# Patient Record
Sex: MALE | Race: WHITE | NOT HISPANIC OR LATINO | URBAN - METROPOLITAN AREA
[De-identification: names, ages, dates, MRNs, and addresses within clinical notes are randomized per-mention and may not be internally consistent; named-entity substitution may affect disease eponyms.]

---

## 2020-02-17 ENCOUNTER — OFFICE VISIT - HEALTHEAST (OUTPATIENT)
Dept: FAMILY MEDICINE | Facility: CLINIC | Age: 25
End: 2020-02-17

## 2020-02-17 DIAGNOSIS — Z76.89 ENCOUNTER TO ESTABLISH CARE: ICD-10-CM

## 2020-02-17 DIAGNOSIS — F41.1 GAD (GENERALIZED ANXIETY DISORDER): ICD-10-CM

## 2020-02-17 DIAGNOSIS — F33.1 MODERATE EPISODE OF RECURRENT MAJOR DEPRESSIVE DISORDER (H): ICD-10-CM

## 2020-02-17 DIAGNOSIS — J45.20 MILD INTERMITTENT ASTHMA WITHOUT COMPLICATION: ICD-10-CM

## 2020-02-17 DIAGNOSIS — E66.813 CLASS 3 SEVERE OBESITY DUE TO EXCESS CALORIES WITHOUT SERIOUS COMORBIDITY WITH BODY MASS INDEX (BMI) OF 40.0 TO 44.9 IN ADULT (H): ICD-10-CM

## 2020-02-17 DIAGNOSIS — E66.01 CLASS 3 SEVERE OBESITY DUE TO EXCESS CALORIES WITHOUT SERIOUS COMORBIDITY WITH BODY MASS INDEX (BMI) OF 40.0 TO 44.9 IN ADULT (H): ICD-10-CM

## 2020-02-17 LAB
ANION GAP SERPL CALCULATED.3IONS-SCNC: 10 MMOL/L (ref 5–18)
BUN SERPL-MCNC: 13 MG/DL (ref 8–22)
CALCIUM SERPL-MCNC: 10.4 MG/DL (ref 8.5–10.5)
CHLORIDE BLD-SCNC: 105 MMOL/L (ref 98–107)
CHOLEST SERPL-MCNC: 144 MG/DL
CO2 SERPL-SCNC: 25 MMOL/L (ref 22–31)
CREAT SERPL-MCNC: 1.22 MG/DL (ref 0.7–1.3)
FASTING STATUS PATIENT QL REPORTED: YES
GFR SERPL CREATININE-BSD FRML MDRD: >60 ML/MIN/1.73M2
GLUCOSE BLD-MCNC: 98 MG/DL (ref 70–125)
HDLC SERPL-MCNC: 39 MG/DL
LDLC SERPL CALC-MCNC: 77 MG/DL
POTASSIUM BLD-SCNC: 4.7 MMOL/L (ref 3.5–5)
SODIUM SERPL-SCNC: 140 MMOL/L (ref 136–145)
TRIGL SERPL-MCNC: 142 MG/DL
TSH SERPL DL<=0.005 MIU/L-ACNC: 0.87 UIU/ML (ref 0.3–5)

## 2020-02-17 ASSESSMENT — ANXIETY QUESTIONNAIRES
GAD7 TOTAL SCORE: 12
6. BECOMING EASILY ANNOYED OR IRRITABLE: MORE THAN HALF THE DAYS
4. TROUBLE RELAXING: MORE THAN HALF THE DAYS
7. FEELING AFRAID AS IF SOMETHING AWFUL MIGHT HAPPEN: SEVERAL DAYS
3. WORRYING TOO MUCH ABOUT DIFFERENT THINGS: MORE THAN HALF THE DAYS
5. BEING SO RESTLESS THAT IT IS HARD TO SIT STILL: SEVERAL DAYS
1. FEELING NERVOUS, ANXIOUS, OR ON EDGE: MORE THAN HALF THE DAYS
IF YOU CHECKED OFF ANY PROBLEMS ON THIS QUESTIONNAIRE, HOW DIFFICULT HAVE THESE PROBLEMS MADE IT FOR YOU TO DO YOUR WORK, TAKE CARE OF THINGS AT HOME, OR GET ALONG WITH OTHER PEOPLE: SOMEWHAT DIFFICULT
2. NOT BEING ABLE TO STOP OR CONTROL WORRYING: MORE THAN HALF THE DAYS

## 2020-02-17 ASSESSMENT — PATIENT HEALTH QUESTIONNAIRE - PHQ9: SUM OF ALL RESPONSES TO PHQ QUESTIONS 1-9: 3

## 2020-02-17 ASSESSMENT — MIFFLIN-ST. JEOR: SCORE: 2730.04

## 2020-02-19 ENCOUNTER — AMBULATORY - HEALTHEAST (OUTPATIENT)
Dept: BEHAVIORAL HEALTH | Facility: CLINIC | Age: 25
End: 2020-02-19

## 2020-03-17 ENCOUNTER — COMMUNICATION - HEALTHEAST (OUTPATIENT)
Dept: FAMILY MEDICINE | Facility: CLINIC | Age: 25
End: 2020-03-17

## 2020-03-18 ENCOUNTER — OFFICE VISIT - HEALTHEAST (OUTPATIENT)
Dept: FAMILY MEDICINE | Facility: CLINIC | Age: 25
End: 2020-03-18

## 2020-03-18 DIAGNOSIS — J45.20 MILD INTERMITTENT ASTHMA WITHOUT COMPLICATION: ICD-10-CM

## 2020-03-18 DIAGNOSIS — F41.1 GAD (GENERALIZED ANXIETY DISORDER): ICD-10-CM

## 2020-03-18 DIAGNOSIS — F33.1 MODERATE EPISODE OF RECURRENT MAJOR DEPRESSIVE DISORDER (H): ICD-10-CM

## 2020-03-18 ASSESSMENT — ANXIETY QUESTIONNAIRES
5. BEING SO RESTLESS THAT IT IS HARD TO SIT STILL: SEVERAL DAYS
IF YOU CHECKED OFF ANY PROBLEMS ON THIS QUESTIONNAIRE, HOW DIFFICULT HAVE THESE PROBLEMS MADE IT FOR YOU TO DO YOUR WORK, TAKE CARE OF THINGS AT HOME, OR GET ALONG WITH OTHER PEOPLE: SOMEWHAT DIFFICULT
2. NOT BEING ABLE TO STOP OR CONTROL WORRYING: MORE THAN HALF THE DAYS
7. FEELING AFRAID AS IF SOMETHING AWFUL MIGHT HAPPEN: SEVERAL DAYS
1. FEELING NERVOUS, ANXIOUS, OR ON EDGE: MORE THAN HALF THE DAYS
GAD7 TOTAL SCORE: 11
6. BECOMING EASILY ANNOYED OR IRRITABLE: SEVERAL DAYS
3. WORRYING TOO MUCH ABOUT DIFFERENT THINGS: NEARLY EVERY DAY
4. TROUBLE RELAXING: SEVERAL DAYS

## 2020-03-18 ASSESSMENT — PATIENT HEALTH QUESTIONNAIRE - PHQ9: SUM OF ALL RESPONSES TO PHQ QUESTIONS 1-9: 4

## 2020-05-11 ENCOUNTER — COMMUNICATION - HEALTHEAST (OUTPATIENT)
Dept: FAMILY MEDICINE | Facility: CLINIC | Age: 25
End: 2020-05-11

## 2020-05-11 DIAGNOSIS — F33.1 MODERATE EPISODE OF RECURRENT MAJOR DEPRESSIVE DISORDER (H): ICD-10-CM

## 2020-05-11 DIAGNOSIS — F41.1 GAD (GENERALIZED ANXIETY DISORDER): ICD-10-CM

## 2020-05-27 ENCOUNTER — COMMUNICATION - HEALTHEAST (OUTPATIENT)
Dept: FAMILY MEDICINE | Facility: CLINIC | Age: 25
End: 2020-05-27

## 2020-05-27 DIAGNOSIS — F41.1 GAD (GENERALIZED ANXIETY DISORDER): ICD-10-CM

## 2020-05-27 DIAGNOSIS — F33.1 MODERATE EPISODE OF RECURRENT MAJOR DEPRESSIVE DISORDER (H): ICD-10-CM

## 2020-06-18 ENCOUNTER — OFFICE VISIT - HEALTHEAST (OUTPATIENT)
Dept: FAMILY MEDICINE | Facility: CLINIC | Age: 25
End: 2020-06-18

## 2020-06-18 DIAGNOSIS — F33.1 MAJOR DEPRESSIVE DISORDER, RECURRENT EPISODE, MODERATE (H): ICD-10-CM

## 2020-06-18 ASSESSMENT — ANXIETY QUESTIONNAIRES
1. FEELING NERVOUS, ANXIOUS, OR ON EDGE: NEARLY EVERY DAY
5. BEING SO RESTLESS THAT IT IS HARD TO SIT STILL: NEARLY EVERY DAY
3. WORRYING TOO MUCH ABOUT DIFFERENT THINGS: NEARLY EVERY DAY
6. BECOMING EASILY ANNOYED OR IRRITABLE: NEARLY EVERY DAY
4. TROUBLE RELAXING: NEARLY EVERY DAY
GAD7 TOTAL SCORE: 21
7. FEELING AFRAID AS IF SOMETHING AWFUL MIGHT HAPPEN: NEARLY EVERY DAY
2. NOT BEING ABLE TO STOP OR CONTROL WORRYING: NEARLY EVERY DAY

## 2020-07-14 ENCOUNTER — OFFICE VISIT - HEALTHEAST (OUTPATIENT)
Dept: FAMILY MEDICINE | Facility: CLINIC | Age: 25
End: 2020-07-14

## 2020-07-14 DIAGNOSIS — F41.1 GAD (GENERALIZED ANXIETY DISORDER): ICD-10-CM

## 2020-07-14 DIAGNOSIS — F33.1 MODERATE EPISODE OF RECURRENT MAJOR DEPRESSIVE DISORDER (H): ICD-10-CM

## 2020-08-03 ENCOUNTER — OFFICE VISIT - HEALTHEAST (OUTPATIENT)
Dept: FAMILY MEDICINE | Facility: CLINIC | Age: 25
End: 2020-08-03

## 2020-08-03 DIAGNOSIS — F33.1 MODERATE EPISODE OF RECURRENT MAJOR DEPRESSIVE DISORDER (H): ICD-10-CM

## 2020-08-03 DIAGNOSIS — F41.0 PANIC ATTACK: ICD-10-CM

## 2020-08-05 ENCOUNTER — COMMUNICATION - HEALTHEAST (OUTPATIENT)
Dept: FAMILY MEDICINE | Facility: CLINIC | Age: 25
End: 2020-08-05

## 2020-08-05 DIAGNOSIS — F33.1 MODERATE EPISODE OF RECURRENT MAJOR DEPRESSIVE DISORDER (H): ICD-10-CM

## 2020-08-05 DIAGNOSIS — F41.1 GAD (GENERALIZED ANXIETY DISORDER): ICD-10-CM

## 2020-08-19 ENCOUNTER — OFFICE VISIT - HEALTHEAST (OUTPATIENT)
Dept: FAMILY MEDICINE | Facility: CLINIC | Age: 25
End: 2020-08-19

## 2020-08-19 DIAGNOSIS — F41.1 GAD (GENERALIZED ANXIETY DISORDER): ICD-10-CM

## 2020-08-19 DIAGNOSIS — J45.20 MILD INTERMITTENT ASTHMA WITHOUT COMPLICATION: ICD-10-CM

## 2020-08-19 DIAGNOSIS — F33.1 MODERATE EPISODE OF RECURRENT MAJOR DEPRESSIVE DISORDER (H): ICD-10-CM

## 2020-08-19 RX ORDER — ALBUTEROL SULFATE 90 UG/1
2 AEROSOL, METERED RESPIRATORY (INHALATION) EVERY 4 HOURS PRN
Qty: 1 EACH | Refills: 3 | Status: SHIPPED | OUTPATIENT
Start: 2020-08-19

## 2020-08-19 RX ORDER — HYDROXYZINE PAMOATE 25 MG/1
25-50 CAPSULE ORAL 2 TIMES DAILY PRN
Qty: 60 CAPSULE | Refills: 0 | Status: SHIPPED | OUTPATIENT
Start: 2020-08-19

## 2020-08-19 ASSESSMENT — ANXIETY QUESTIONNAIRES
7. FEELING AFRAID AS IF SOMETHING AWFUL MIGHT HAPPEN: SEVERAL DAYS
2. NOT BEING ABLE TO STOP OR CONTROL WORRYING: SEVERAL DAYS
3. WORRYING TOO MUCH ABOUT DIFFERENT THINGS: MORE THAN HALF THE DAYS
IF YOU CHECKED OFF ANY PROBLEMS ON THIS QUESTIONNAIRE, HOW DIFFICULT HAVE THESE PROBLEMS MADE IT FOR YOU TO DO YOUR WORK, TAKE CARE OF THINGS AT HOME, OR GET ALONG WITH OTHER PEOPLE: SOMEWHAT DIFFICULT
GAD7 TOTAL SCORE: 9
1. FEELING NERVOUS, ANXIOUS, OR ON EDGE: MORE THAN HALF THE DAYS
6. BECOMING EASILY ANNOYED OR IRRITABLE: SEVERAL DAYS
5. BEING SO RESTLESS THAT IT IS HARD TO SIT STILL: SEVERAL DAYS
4. TROUBLE RELAXING: SEVERAL DAYS

## 2020-08-19 ASSESSMENT — PATIENT HEALTH QUESTIONNAIRE - PHQ9: SUM OF ALL RESPONSES TO PHQ QUESTIONS 1-9: 6

## 2020-09-03 ENCOUNTER — COMMUNICATION - HEALTHEAST (OUTPATIENT)
Dept: FAMILY MEDICINE | Facility: CLINIC | Age: 25
End: 2020-09-03

## 2020-09-03 DIAGNOSIS — F41.1 GAD (GENERALIZED ANXIETY DISORDER): ICD-10-CM

## 2020-09-03 DIAGNOSIS — F33.1 MODERATE EPISODE OF RECURRENT MAJOR DEPRESSIVE DISORDER (H): ICD-10-CM

## 2020-09-06 RX ORDER — BUPROPION HYDROCHLORIDE 300 MG/1
300 TABLET ORAL EVERY MORNING
Qty: 90 TABLET | Refills: 3 | Status: SHIPPED | OUTPATIENT
Start: 2020-09-06 | End: 2021-08-18

## 2020-09-24 ENCOUNTER — RECORDS - HEALTHEAST (OUTPATIENT)
Dept: ADMINISTRATIVE | Facility: OTHER | Age: 25
End: 2020-09-24

## 2020-10-22 ENCOUNTER — OFFICE VISIT - HEALTHEAST (OUTPATIENT)
Dept: BEHAVIORAL HEALTH | Facility: CLINIC | Age: 25
End: 2020-10-22

## 2020-10-22 DIAGNOSIS — F41.9 ANXIETY DISORDER, UNSPECIFIED TYPE: ICD-10-CM

## 2020-10-22 DIAGNOSIS — F32.9 MAJOR DEPRESSIVE DISORDER, SINGLE EPISODE, UNSPECIFIED: ICD-10-CM

## 2020-10-24 ENCOUNTER — COMMUNICATION - HEALTHEAST (OUTPATIENT)
Dept: BEHAVIORAL HEALTH | Facility: CLINIC | Age: 25
End: 2020-10-24

## 2020-10-27 ASSESSMENT — ANXIETY QUESTIONNAIRES
6. BECOMING EASILY ANNOYED OR IRRITABLE: SEVERAL DAYS
5. BEING SO RESTLESS THAT IT IS HARD TO SIT STILL: SEVERAL DAYS
2. NOT BEING ABLE TO STOP OR CONTROL WORRYING: MORE THAN HALF THE DAYS
4. TROUBLE RELAXING: SEVERAL DAYS
3. WORRYING TOO MUCH ABOUT DIFFERENT THINGS: MORE THAN HALF THE DAYS
1. FEELING NERVOUS, ANXIOUS, OR ON EDGE: MORE THAN HALF THE DAYS
7. FEELING AFRAID AS IF SOMETHING AWFUL MIGHT HAPPEN: SEVERAL DAYS
GAD7 TOTAL SCORE: 10

## 2020-10-27 ASSESSMENT — PATIENT HEALTH QUESTIONNAIRE - PHQ9: SUM OF ALL RESPONSES TO PHQ QUESTIONS 1-9: 9

## 2020-11-12 ENCOUNTER — OFFICE VISIT - HEALTHEAST (OUTPATIENT)
Dept: BEHAVIORAL HEALTH | Facility: CLINIC | Age: 25
End: 2020-11-12

## 2020-11-12 DIAGNOSIS — F32.9 MAJOR DEPRESSIVE DISORDER, SINGLE EPISODE, UNSPECIFIED: ICD-10-CM

## 2020-11-12 DIAGNOSIS — F41.1 GAD (GENERALIZED ANXIETY DISORDER): ICD-10-CM

## 2020-11-17 ENCOUNTER — OFFICE VISIT - HEALTHEAST (OUTPATIENT)
Dept: BEHAVIORAL HEALTH | Facility: CLINIC | Age: 25
End: 2020-11-17

## 2020-11-17 DIAGNOSIS — F32.9 CURRENT EPISODE OF MAJOR DEPRESSIVE DISORDER WITHOUT PRIOR EPISODE, UNSPECIFIED DEPRESSION EPISODE SEVERITY: ICD-10-CM

## 2020-11-17 DIAGNOSIS — F41.1 GAD (GENERALIZED ANXIETY DISORDER): ICD-10-CM

## 2020-11-30 ENCOUNTER — OFFICE VISIT - HEALTHEAST (OUTPATIENT)
Dept: BEHAVIORAL HEALTH | Facility: CLINIC | Age: 25
End: 2020-11-30

## 2020-11-30 DIAGNOSIS — F32.9 CURRENT EPISODE OF MAJOR DEPRESSIVE DISORDER WITHOUT PRIOR EPISODE, UNSPECIFIED DEPRESSION EPISODE SEVERITY: ICD-10-CM

## 2020-11-30 DIAGNOSIS — F41.1 GAD (GENERALIZED ANXIETY DISORDER): ICD-10-CM

## 2020-12-14 ENCOUNTER — OFFICE VISIT - HEALTHEAST (OUTPATIENT)
Dept: BEHAVIORAL HEALTH | Facility: CLINIC | Age: 25
End: 2020-12-14

## 2020-12-14 DIAGNOSIS — F41.1 GAD (GENERALIZED ANXIETY DISORDER): ICD-10-CM

## 2020-12-14 DIAGNOSIS — F32.9 CURRENT EPISODE OF MAJOR DEPRESSIVE DISORDER WITHOUT PRIOR EPISODE, UNSPECIFIED DEPRESSION EPISODE SEVERITY: ICD-10-CM

## 2021-01-04 ENCOUNTER — AMBULATORY - HEALTHEAST (OUTPATIENT)
Dept: BEHAVIORAL HEALTH | Facility: CLINIC | Age: 26
End: 2021-01-04

## 2021-01-04 ENCOUNTER — OFFICE VISIT - HEALTHEAST (OUTPATIENT)
Dept: BEHAVIORAL HEALTH | Facility: CLINIC | Age: 26
End: 2021-01-04

## 2021-01-04 DIAGNOSIS — F32.9 CURRENT EPISODE OF MAJOR DEPRESSIVE DISORDER WITHOUT PRIOR EPISODE, UNSPECIFIED DEPRESSION EPISODE SEVERITY: ICD-10-CM

## 2021-01-04 DIAGNOSIS — F41.1 GAD (GENERALIZED ANXIETY DISORDER): ICD-10-CM

## 2021-02-10 ENCOUNTER — OFFICE VISIT - HEALTHEAST (OUTPATIENT)
Dept: FAMILY MEDICINE | Facility: CLINIC | Age: 26
End: 2021-02-10

## 2021-02-10 DIAGNOSIS — F41.9 ANXIETY: ICD-10-CM

## 2021-02-16 ENCOUNTER — OFFICE VISIT - HEALTHEAST (OUTPATIENT)
Dept: FAMILY MEDICINE | Facility: CLINIC | Age: 26
End: 2021-02-16

## 2021-02-16 ENCOUNTER — COMMUNICATION - HEALTHEAST (OUTPATIENT)
Dept: FAMILY MEDICINE | Facility: CLINIC | Age: 26
End: 2021-02-16

## 2021-02-16 DIAGNOSIS — R41.3 MEMORY DEFICIT: ICD-10-CM

## 2021-02-16 DIAGNOSIS — F41.0 PANIC ATTACK: ICD-10-CM

## 2021-02-16 DIAGNOSIS — F33.1 MODERATE EPISODE OF RECURRENT MAJOR DEPRESSIVE DISORDER (H): ICD-10-CM

## 2021-02-16 RX ORDER — LORAZEPAM 0.5 MG/1
0.5 TABLET ORAL EVERY 8 HOURS PRN
Qty: 10 TABLET | Refills: 0 | Status: SHIPPED | OUTPATIENT
Start: 2021-02-16

## 2021-02-21 ASSESSMENT — ANXIETY QUESTIONNAIRES
GAD7 TOTAL SCORE: 9
3. WORRYING TOO MUCH ABOUT DIFFERENT THINGS: SEVERAL DAYS
7. FEELING AFRAID AS IF SOMETHING AWFUL MIGHT HAPPEN: NOT AT ALL
6. BECOMING EASILY ANNOYED OR IRRITABLE: MORE THAN HALF THE DAYS
2. NOT BEING ABLE TO STOP OR CONTROL WORRYING: SEVERAL DAYS
7. FEELING AFRAID AS IF SOMETHING AWFUL MIGHT HAPPEN: NOT AT ALL
5. BEING SO RESTLESS THAT IT IS HARD TO SIT STILL: MORE THAN HALF THE DAYS
4. TROUBLE RELAXING: MORE THAN HALF THE DAYS
1. FEELING NERVOUS, ANXIOUS, OR ON EDGE: SEVERAL DAYS

## 2021-02-21 ASSESSMENT — PATIENT HEALTH QUESTIONNAIRE - PHQ9
10. IF YOU CHECKED OFF ANY PROBLEMS, HOW DIFFICULT HAVE THESE PROBLEMS MADE IT FOR YOU TO DO YOUR WORK, TAKE CARE OF THINGS AT HOME, OR GET ALONG WITH OTHER PEOPLE: VERY DIFFICULT
SUM OF ALL RESPONSES TO PHQ QUESTIONS 1-9: 11
SUM OF ALL RESPONSES TO PHQ QUESTIONS 1-9: 11

## 2021-02-22 ENCOUNTER — VIRTUAL VISIT (OUTPATIENT)
Dept: PSYCHOLOGY | Facility: CLINIC | Age: 26
End: 2021-02-22
Payer: COMMERCIAL

## 2021-02-22 DIAGNOSIS — F41.1 GENERALIZED ANXIETY DISORDER: ICD-10-CM

## 2021-02-22 DIAGNOSIS — F33.0 MAJOR DEPRESSIVE DISORDER, RECURRENT EPISODE, MILD WITH ANXIOUS DISTRESS (H): Primary | ICD-10-CM

## 2021-02-22 PROCEDURE — 90791 PSYCH DIAGNOSTIC EVALUATION: CPT | Mod: 95 | Performed by: PSYCHOLOGIST

## 2021-02-22 ASSESSMENT — COLUMBIA-SUICIDE SEVERITY RATING SCALE - C-SSRS
1. IN THE PAST MONTH, HAVE YOU WISHED YOU WERE DEAD OR WISHED YOU COULD GO TO SLEEP AND NOT WAKE UP?: NO
TOTAL  NUMBER OF ABORTED OR SELF INTERRUPTED ATTEMPTS PAST LIFETIME: NO

## 2021-02-22 ASSESSMENT — ANXIETY QUESTIONNAIRES: GAD7 TOTAL SCORE: 9

## 2021-02-22 ASSESSMENT — PATIENT HEALTH QUESTIONNAIRE - PHQ9: SUM OF ALL RESPONSES TO PHQ QUESTIONS 1-9: 11

## 2021-02-22 NOTE — PROGRESS NOTES
"[facility]   Provider Name:  Swapna Greta     Credentials:  Charles PUENTES    PATIENT'S NAME: Jose D Dickens III  PREFERRED NAME: Gene  PRONOUNS:      He/Him/His  MRN: [id number]  : 1995  ADDRESS: [address] [place] [id number]   ACCT. NUMBER:  [id number]  DATE OF SERVICE: 21  START TIME: 7:00AM  END TIME: 7:55AM  PREFERRED PHONE: [phone]   May we leave a program related message: Yes  SERVICE MODALITY:  Video Visit:      Provider verified identity through the following two step process.  Patient provided:  Patient     Telemedicine Visit: The patient's condition can be safely assessed and treated via synchronous audio and visual telemedicine encounter.      Reason for Telemedicine Visit: Services only offered telehealth    Originating Site (Patient Location): Patient's home    Distant Site (Provider Location): Provider Remote Setting    Consent:  The patient/guardian has verbally consented to: the potential risks and benefits of telemedicine (video visit) versus in person care; bill my insurance or make self-payment for services provided; and responsibility for payment of non-covered services.     Patient would like the video invitation sent by:  Text to cell phone: [phone]     Mode of Communication:  Video Conference via Amwell    As the provider I attest to compliance with applicable laws and regulations related to telemedicine.    UNIVERSAL ADULT Mental Health DIAGNOSTIC ASSESSMENT      Identifying Information:  Patient is a 25 year old, .  The pronoun use throughout this assessment reflects the patient's chosen pronoun.  Patient was referred for an assessment by self and girlfriend who has ADHD and PCP Yamel Mckay, DELB[place].  Patient attended the session alone.       Chief Complaint:   The reason for seeking services at this time is: \" lack of follow through with needing to change behaviors, easily distracted, disorganized, motivation to begin " "tasks, task completion, interrupts, and difficulty listening in conversations. \"   The problem(s) began as early as middle school. Patient has not attempted to resolve these concerns in the past for ADHD.    Patient has used medication and therapy for anxiety and depression. Patient is currently in therapy with DEEJAY García through [facility].     Social/Family History:  Patient reported they grew up in [place], [place].  They were raised by biological parents.  Parents stayed ..   Patient reported that their childhood was \"pretty good, enjoyable, I don't have any real stress in my life until I went to college.\" Patient described their current relationships with family of origin as close with his two younger sisters. Patient reported that he gets along well with his parents.       The patient describes their cultural background as \"middle class, [place], raised Christianity and now Atheist.\" Cultural influences and impact on patient's life structure, values, norms, and healthcare: Time Orientation: being on time is important, Locus of Control: both internal and external, leans internal for the short term, Spiritual Beliefs: rasied Christianity and now Atheist and Health Beliefs and the endorsement of OR engagement in Culturally Specific Healing Practices: mostly Western Medicine, but also likes to use meditation.  Contextual influences on patient's health include: Individual Factors had to find a new therapist at age 15 due to insruance, and that \"turned\" him off from therapy for years, Family Factors generally seek help, Economic Factors economics stopped him from seeking help \"somewhat\" as a child and yes as an early adult and Health- Seeking Factors \"I always want to seek help when needed, but sometimes avoid for financial reasons\".    These factors will be addressed in the Preliminary Treatment plan.  Patient identified their preferred language to be English. Patient reported they does " "not need the assistance of an  or other support involved in therapy.     Patient reported experienced significant delays in developmental tasks, such as difficulty with attention and concentration, but \"did really well in school\". .   Patient's highest education level was graduate school in Chemistry. Patient identified the following learning problems: attention, concentration and reading comprehension.  Modifications will not be used to assist communication in therapy.   Patient reports they are  able to understand written materials.    Patient reported the following relationship history includes his currently relationship only.  Patient's current relationship status is partnered / significant other for 5 years.   Patient identified their sexual orientation as heterosexual.  Patient reported having zero child(randall). Patient identified partner, friends and parents only for financial support for parents as part of their support system.  Patient identified the quality of these relationships as stable and meaningful.      Patient's current living/housing situation involves staying in own home/apartment.  They live with girlfriend and they report that housing is stable.     Patient is currently employed full time and reports they are able to function appropriately at work. Patient reported that he feels like he could do better at his job, but his attention gets in the way.  Patient reports their finances are obtained through employment.  Patient does identify finances as a current stressor.      Patient reported that they have been involved with the legal system.  Patient reported that he was arrested at the age of 22 for marijuana possession. Patient reported that the charges were dropped and he was never on probation.  Patient denies being on probation / parole / under the jurisdiction of the court.    Patient's Strengths and Limitations:  Patient identified the following strengths or resources that will " "help them succeed in treatment: commitment to health and well being and intelligence. Things that may interfere with the patient's success in treatment include: none identified.     Personal and Family Medical History:   Patient does report a family history of mental health concerns.  Patient reports family history is not on file. Patient reported that his father has a history of anxiety. Patient reported that one of his sisters and his mother have had anxiety since the COVID-19 pandemic. Patient reported that his other sister has \"severe depression.\"     Patient does report Mental Health Diagnosis and/or Treatment.  Patient reported the following previous diagnoses which include(s): an Anxiety Disorder and Depression.  Patient reported symptoms began for depression when he was approximately 15 years of age. He said that he experienced \"a decent amount of bullying\" due to his weight.  Patient reported that his depression has been on and off since the age of 15 and his most recent episode of depression began approximately March 2020 when the COVID-19 pandemic started. Patient reported that he feels like he is coming out of his current episode of depression. Patient reported that his current depression is related to \"job issues that aren't be resolved.\"  Patient reported that his anxiety began around the age of 11 and was due to bullying and not wanting to go to school. Patient has received mental health services in the past: therapy with [place] with [facility] and [facility] and primary care provider at [facility] with Yamel Mckay FNP  . He is currently in therapy with DEEJAY García through [facility]. Psychiatric Hospitalizations: None.  Patient denies a history of civil commitment.  Currently, patient is receiving other mental health services.  These include psychotherapy with DEEJAY García and primary care provider at [facility].  For follow-up on TBD.       Patient " "has not had a physical exam to rule out medical causes for current symptoms.  Date of last physical exam was greater than a year ago and client was encouraged to schedule an exam with PCP. The patient has a [facility] Primary Care Provider, who is named No primary care provider on file...  Patient reports the following current medical concerns: weight and eating habits.  Patient denies any issues with pain..   There are significant appetite / nutritional concerns / weight changes. Patient reported a history of weight management issues. Patient reported that he \"neglects\" to eat and then later \"binges.\"  Patient does report a history of head injury / trauma / cognitive impairment.  Patient reported that he had a concussion at the age of 14. He said that he was doing wall sits and his feet slipped out from under him due to wet shoes. As a result, his head his the wall. Patient reported that the concussion was \"mild\" and he reported that he fully recovered.     Patient reports current meds as:   Bupropion 300mg once a day  Buspirone 15mg 3 times a day  Ativan 1/2 mg PRN for anxiety attacks. He reported that he takes one Ativan approximately every two weeks.  Albuterol for exercise induced asthma.       Medication Adherence:  Patient reports taking prescribed medications as prescribed.    Patient Allergies:  Not on File    Medical History:  No past medical history on file.      Current Mental Status Exam:   Appearance:  Appropriate    Eye Contact:  Good   Psychomotor:  Normal       Gait / station:  no problem  Attitude / Demeanor: Cooperative   Speech      Rate / Production: Normal/ Responsive      Volume:  Normal  volume      Language:  intact  Mood:   Normal  Affect:   Appropriate    Thought Content: Clear   Thought Process: Coherent  Logical       Associations: No loosening of associations  Insight:   Good   Judgment:  Intact   Orientation:  All  Attention/concentration: Good    Rating Scales:    PHQ9:    PHQ-9 SCORE " 2/21/2021 2/21/2021   PHQ-9 Total Score MyChart - 11 (Moderate depression)   PHQ-9 Total Score 11 11   ;    GAD7:    SHAUN-7 SCORE 2/21/2021 2/21/2021   Total Score - 9 (mild anxiety)   Total Score 9 9     CGI:     First:No data recorded;    Most recentNo data recorded    Substance Use:  Patient did not report a family history of substance use concerns; see medical history section for details.  Patient has not received chemical dependency treatment in the past.  Patient has not ever been to detox.  Patient was arrested for possession of marijuana, but the charges were dropped and no treatment required.    Patient is not currently receiving any chemical dependency treatment. Patient reported the following problems as a result of their substance use: no issues.    Patient reports using alcohol 2 times per month and has 6 beers at a time. Patient first started drinking at age 19.  Patient reported date of last use was at least 2 or 3 weeks ago.  Patient reports heaviest use was undergraduate college.  Patient denies using tobacco.  Patient reports using marijuana 1 times per every three months and smokes 1 at a time. Patient started using marijuana at age 18.  Patient reports last use was about one month ago.  Patient reports heaviest use was undergraduate college.  Patient reports using caffeine 1 times per day and drinks 2 at a time. Patient started using caffeine at age 20.  Patient reports using/abusing the following substance(s). Patient reported no other substance use.     CAGE- AID:    CAGE-AID Total Score 2/21/2021   Total Score 0   Total Score MyChart 0 (A total score of 2 or greater is considered clinically significant)       Substance Use: No symptoms    Based on the negative CAGE score and clinical interview there  are not indications of drug or alcohol abuse.    Significant Losses / Trauma / Abuse / Neglect Issues:   Patient did not serve in the .  There are indications or report of significant loss,  trauma, abuse or neglect issues related to: none. Patient was bullied in middle school and high school due to weight  Concerns for possible neglect are not present.     Safety Assessment:   Current Safety Concerns:  [place] Suicide Severity Rating Scale (Lifetime/Recent)  [place] Suicide Severity Rating (Lifetime/Recent) 2/22/2021   1. Wish to be Dead (Lifetime) No   Aborted or Self-Interrupted Attempt (Lifetime) No     Patient denies current homicidal ideation and behaviors.  Patient denies current self-injurious ideation and behaviors.    Patient denied risk behaviors associated with substance use.  Patient denies any high risk behaviors associated with mental health symptoms.  Patient reports the following current concerns for their personal safety: None.  Patient reports there are  firearms in the house. The firearms are secured in a locked space.     History of Safety Concerns:  Patient denied a history of homicidal ideation.     Patient denied a history of personal safety concerns.    Patient denied a history of assaultive behaviors.    Patient denied a history of sexual assault behaviors.     Patient denied a history of risk behaviors associated with substance use.  Patient denies any history of high risk behaviors associated with mental health symptoms.  Patient reports the following protective factors: positive relationships positive social network and positive family connections, safe and stable environment, adherence with prescribed medication, living with other people and daily obligations    Risk Plan:  See Recommendations for Safety and Risk Management Plan    Review of Symptoms per patient report:  Depression: Lack of interest, Excessive or inappropriate guilt, Change in energy level, Difficulties concentrating, Change in appetite, Psychomotor slowing or agitation, Irritability and Feeling sad, down, or depressed  Malia:  No Symptoms  Psychosis: No Symptoms  Anxiety: Excessive worry, Nervousness,  Poor concentration and Irritability  Panic:  No symptoms  Post Traumatic Stress Disorder:  No Symptoms   Eating Disorder: No Symptoms  ADD / ADHD:  Inattentive, Difficulties listening, Poor task completion, Poor organizational skills, Distractibility, Forgetful, Interrupts and Impulsive  Conduct Disorder: No symptoms  Autism Spectrum Disorder: No symptoms  Obsessive Compulsive Disorder: No Symptoms    Patient reports the following compulsive behaviors and treatment history: Picking - has not had treatment. and Pornography - has not had treatment..      Diagnostic Criteria:   A. Excessive anxiety and worry about a number of events or activities (such as work or school performance).   B. The person finds it difficult to control the worry.  C. Select 3 or more symptoms (required for diagnosis). Only one item is required in children.   - Restlessness or feeling keyed up or on edge.    - Being easily fatigued.    - Difficulty concentrating or mind going blank.    - Irritability.   D. The focus of the anxiety and worry is not confined to features of an Axis I disorder.  E. The anxiety, worry, or physical symptoms cause clinically significant distress or impairment in social, occupational, or other important areas of functioning.   F. The disturbance is not due to the direct physiological effects of a substance (e.g., a drug of abuse, a medication) or a general medical condition (e.g., hyperthyroidism) and does not occur exclusively during a Mood Disorder, a Psychotic Disorder, or a Pervasive Developmental Disorder.    - The aformentioned symptoms began 14 year(s) ago and occurs 7 days per week and is experienced as mild.  A) Recurrent episode(s) - symptoms have been present during the same 2-week period and represent a change from previous functioning 5 or more symptoms (required for diagnosis)   - Depressed mood. Note: In children and adolescents, can be irritable mood.     - Diminished interest or pleasure in all, or  almost all, activities.    - Significant weight gainincrease in appetite.    - Psychomotor activity agitation.    - Fatigue or loss of energy.    - Feelings of worthlessness or inappropriate and excessive guilt.    - Diminished ability to think or concentrate, or indecisiveness.   B) The symptoms cause clinically significant distress or impairment in social, occupational, or other important areas of functioning  C) The episode is not attributable to the physiological effects of a substance or to another medical condition  D) The occurence of major depressive episode is not better explained by other thought / psychotic disorders  E) There has never been a manic episode or hypomanic episode    Functional Status:  Patient reports the following functional impairments: academic performance, health maintenance, home life with girlfriend, management of the household and or completion of tasks, operation of a motor vehicle, organization, relationship(s), self-care, social interactions and work / vocational responsibilities.       WHODAS:   WHODAS 2.0 Total Score 2/21/2021 2/21/2021   Total Score 44 44   Total Score MyChart - 44     Nonprogrammatic care:  Patient is requesting basic services to address current mental health concerns.    Clinical Summary:  1. Reason for assessment: ADHD Assessment  .  2. Psychosocial, Cultural and Contextual Factors: Nothing applicable to the ADHD Assessment.  3. Principal DSM5 Diagnoses  (Sustained by DSM5 Criteria Listed Above):   296.31 (F33.0) Major Depressive Disorder, Recurrent Episode, Mild With anxious distress  300.02 (F41.1) Generalized Anxiety Disorder.  4. Other Diagnoses that is relevant to services:   RO ADHD, Panic Disorder, Excoriation  5. Provisional Diagnosis: NA  6. Prognosis: Expect Improvement if symptoms are treated.  7. Likely consequences of symptoms if not treated: symptoms likely to persist and may worsen.  8. Client strengths include:  caring, educated and employed  .     Recommendations:     1. Plan for Safety and Risk Management:   Recommended that patient call 911 or go to the local ED should there be a change in any of these risk factors..          Report to child / adult protection services was NA.     2. Patient's identified nothing applicable to the ADHD assesment.     3. Initial Treatment will focus on:    Attentional Problems - complete ADHD assessment.     4. Resources/Service Plan:    services are not indicated.   Modifications to assist communication are not indicated.   Additional disability accommodations are not indicated.      5. Collaboration:   Collaboration / coordination of treatment will be initiated with the following  support professionals: primary care physician and outpatient therapist.      6.  Referrals:   The following referral(s) will be initiated:NA     A Release of Information has been obtained for the following:NA  7. LUDIVINA:    LUDIVINA:  Discussed the general effects of drugs and alcohol on health and well-being. Provider gave patient printed information about the effects of chemical use on their health and well being. Recommendations:  Continue with current use. Consider decreasing caffeine.     8. Records:   These were reviewed at time of assessment.   Information in this assessment was obtained from the medical record and  provided by patient who is a good historian.    Patient will have open access to their mental health medical record.      Patient was informed that he will be emailed the self and collaborative rating scales to be completed. Depression and anxiety rating scales were completed.  Copies of previous report cards were requested.  Patient provided consent to email the questionnaires to [email or web address]      Provider Name/ Credentials:  Swapna Hernandes PsyD LP   February 22, 2021                Answers for HPI/ROS submitted by the patient on 2/21/2021   If you checked off any problems, how difficult have these problems  made it for you to do your work, take care of things at home, or get along with other people?: Very difficult  PHQ9 TOTAL SCORE: 11  SHAUN 7 TOTAL SCORE: 9

## 2021-02-22 NOTE — Clinical Note
Carson Traore. I am doing the ADHD assessment for the patient.     I diagnosed him with depression and anxiety and am ruling out ADHD, Panic Disorder, Excoriation.    - How long have you worked with the patient?  -What are the patient's diagnoses?  -Does the patient typically complete homework?  - Does the patient arrive on time for appointments?  -Does the patient appear hyperactive or easily distracted in sessions?    Thank you and please let me know if you have any questions.     Swapna Hernandes PsyD LP

## 2021-03-03 ENCOUNTER — VIRTUAL VISIT (OUTPATIENT)
Dept: PSYCHOLOGY | Facility: CLINIC | Age: 26
End: 2021-03-03
Payer: COMMERCIAL

## 2021-03-03 DIAGNOSIS — F41.1 GENERALIZED ANXIETY DISORDER: ICD-10-CM

## 2021-03-03 DIAGNOSIS — L98.1 EXCORIATION, NEUROTIC: ICD-10-CM

## 2021-03-03 DIAGNOSIS — F33.0 MAJOR DEPRESSIVE DISORDER, RECURRENT EPISODE, MILD WITH ANXIOUS DISTRESS (H): Primary | ICD-10-CM

## 2021-03-03 PROCEDURE — 90834 PSYTX W PT 45 MINUTES: CPT | Mod: 95 | Performed by: PSYCHOLOGIST
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
## 2021-03-04 ENCOUNTER — DOCUMENTATION ONLY (OUTPATIENT)
Dept: PSYCHOLOGY | Facility: CLINIC | Age: 26
End: 2021-03-04
Payer: COMMERCIAL

## 2021-03-04 NOTE — PROGRESS NOTES
"[facility]  ADHD Evaluation         Patient: Jose D Dickens III   YOB: 1995  MRN: [id number]  Date(s) of assessment:  Arnold self-report and collateral measures scored and interpreted 03/04/2021      Assessment tools:      Arnold Adult ADHD Rating Scale-IV: Self and Other Reports (BAARS-IV), Arnold Functional Impairment Scale: Self and Other Reports (BFIS) and Arnold Deficits in Executive Functioning Scale: Self and Other Reports (BDEFS)    Assessment Results:        Arnold Adult ADHD Rating Scale-IV: Self and Other Reports (BAARS-IV)  The BAARS-IV assesses for symptoms of ADHD that are experienced in one's daily life. This assessment measure includes self and collateral rating scales designed to provide information regarding current and childhood symptoms of ADHD including inattention, hyperactivity, and impulsivity. Self-report scores are reported as percentiles. Scores at the 76th-83rd percentile are considered marginal, scores at the 84th-92nd percentile are considered borderline, scores at the 93rd-95th percentile are considered mild, scores at the 96th-98th percentile are considered moderate, and those at the 99th percentile are considered severe. Collateral or \"other\" rating scales are reported as number of symptoms observed in comparison to those reported by the client. Norms and percentile scores are not available for collateral reports.     Current Symptoms Scale--Self Report:   Client completed the self-report inventory of current symptoms. The results indicate that the client's Total ADHD Score was 52 which places him in the 98th percentile for overall ADHD symptoms. In addition, the client endorsed 6/9 (97th percentile) Inattention symptoms, 4/9 (95th percentile) Hyperactivity -Impulsivity symptoms, and 6/9 (96th percentile) Sluggish Cognitive Tempo symptoms. Client indicated that the reported symptoms have resulted in impaired functioning in school, work and " social relationships. Overall, the results suggest the client is experiencing Moderate ADHD symptoms.     Current Symptoms Scale--Other Report:  Client's Partner completed the collateral report inventory of current symptoms. Based on the collateral contact's observation of symptoms, the client demonstrates 8/9 Inattention symptoms, 3/5 Hyperactivity symptoms, 3/4 Impulsivity symptoms, and 6/9 Sluggish Cognitive Tempo symptoms. The client's Total ADHD Score was 60. The collateral contact indicated the client demonstrates impaired functioning in home, work and social relationships} The collateral- and self-report scores are significantly different Impulsivity. All other scores are within a normal range of difference.    Childhood Symptoms Scale--Self-Report:  Client completed the self-report inventory of childhood symptoms. The results indicate that the client's Total ADHD Score was 46 which places him in the 93rd percentile for overall ADHD symptoms in childhood. In addition, the client endorsed 5/9 (92nd percentile) Inattention symptoms and  4/9 (89th percentile) Hyperactivity-Impulsivity symptoms. Client indicated that the reported symptoms resulted in impaired functioning in school and social relationships Overall, the results suggest the client experienced  Mild symptoms of ADHD as a child.     Childhood Symptoms Scale--Other Report:  Client's Mother completed the collateral report inventory of childhood symptoms. Based on the collateral contact's recollection of client's childhood symptoms, the client demonstrated 7/9 Inattention symptoms and 7/9 Hyperactivity-Impulsivity symptoms. The client's Total ADHD Score was 57. The collateral contact indicated the client demonstrates impaired functioning in school and social relationships. The collateral- and self-report scores are not significantly different.                           Arnold Functional Impairment Scale: Self and Other Reports (BFIS)  The BFIS is used  "to assess an individuals' psychosocial impairment in major life/daily activities that may be due to a mental health disorder. This assessment measure includes self and collateral rating scales. Self-report scores are reported as percentiles. Scores at the 76th-83rd percentile are considered marginal, scores at the 84th-92nd percentile are considered borderline, scores at the 93rd-95th percentile are considered mild, scores at the 96th-98th percentile are considered moderate, and those at the 99th percentile are considered severe.Collateral or \"other\" rating scales are reported as number of symptoms observed in comparison to those reported by the client. Norms and percentile scores are not available for collateral reports.     Results indicate the client identified impairment (scores at or greater than 93rd percentile) in the following areas: home-chores, work, marriage/cohabiting/dating, daily responsibilities, self-care routines and health maintenance The client's Mean Impairment Score was 6 (95th percentile) indicating the client is reporting Mild impairment in functioning across domains. Client's Partner completed the collateral rating scale, which indicated similar results. The collateral contact's scores were generally lower than the client's report.     Arnold Deficits in Executive Functioning Scale (BDEFS)  The BDEFS is a measure used for evaluating dimensions of adult executive functioning in daily life.This assessment measure includes self and collateral rating scales. Self-report scores are reported as percentiles. Scores at the 76th-83rd percentile are considered marginal, scores at the 84th-92nd percentile are considered borderline, scores at the 93rd-95th percentile are considered mild, scores at the 96th-98th percentile are considered moderate, and those at the 99th percentile are considered severe.Collateral or \"other\" rating scales are reported as number of symptoms observed in comparison to those " reported by the client. Norms and percentile scores are not available for collateral reports.     Results indicate the client's Total Executive Functioning Score was 247  (98th percentile). The ADHD-Executive Functioning Index score was 34 (99th percentile). These scores suggest the client has Moderate to Severe deficits in executive functioning. These deficits may be due to ADHD. Results indicate the client identified significant deficits in the following areas: self-management to time 98th , self-organization/problem-solving 94th,  self-restraint 96th, self-motivation 99th and self-regulation of emotions 95th. Client's Partner completed the collateral rating scale, which indicated similar results. The collateral contact's scores were generally higher than the client's report.    Next Step: Ask Patient to complete the MMPI.       Swapna Hernandes PsyD   3/4/2021

## 2021-03-07 ENCOUNTER — HEALTH MAINTENANCE LETTER (OUTPATIENT)
Age: 26
End: 2021-03-07

## 2021-03-08 ENCOUNTER — DOCUMENTATION ONLY (OUTPATIENT)
Dept: PSYCHOLOGY | Facility: CLINIC | Age: 26
End: 2021-03-08
Payer: COMMERCIAL

## 2021-03-08 NOTE — PROGRESS NOTES
Summary of MMPI-2 Results      Patient completed the [place] Multiphasic Personality Inventory-2 (MMPI-2), a self-report personality inventory, as a part of the psychological assessment for ruling out Attention Deficit disorders.  Validity scales indicate that the Patient responded in an open and consistent manner, resulting in a valid profile.  His responses yielded a profile that is consistent with people who report rouble with concentration, depression, anxiety, obsessive behaviors, and are described as introverted.    Patient responded similarly to people who report depression, suicidal ideation, and feelings of unworthiness and inadequacy. People like this tend to overreact to minor stress, ruminate, have a high need for achievement, and feel guilty when goals are not met. They tend to be indecisive, feel inadequate, and feel insecure. Moreover, they often feel extremely sensitive to opinions of others, may feel mistreated, and blame others. Furthermore, people like this tend to be suspicious, resentful, withdrawn, hostile and argumentative. They often have low opinions of themselves, and do not feel they are liked or are important. They tend to feel unattractive, awkward and clumsy, useless, and like a burden. People like this often lack self-confidence and find it hard to accept compliments.    People who responded with similar answers to the patient, report anxiety, fatigue, insomnia, bad dreams, guilt, perfectionism, and feeling unaccepted. People like this often feel insecure, pessimistic, and have low self-esteem. They also tend to experience tension and have expectations and/or perceptions of failure. People like this also tend to socially withdraw. Moreover, these people often experience somatic problems, sleep difficulties, worries, and poor concentration. They also tend to be distressed by change and may report some compulsive behaviors. They tend to be excessive worriers who become overwhelmed by  their own thoughts. These people often avoid aversive events and are worry-prone. They tend be overly self-critical, feel guilty, catastrophize, and focus on the negative.    Patient responded similarly to people who experience low self-confidence, poor concentration, obsessiveness, tension, and indecisiveness. They also often have a lack of family support for their career, question their choice of career, and have negative attitudes toward coworkers. People like this tend to have experienced difficulty adjusting to life after high school.    Results of testing were consistent with his report upon direct interview.     Next step: Consult with patient's collateral (s).     Swapna Hernandes PsyD LP 3/8/2021

## 2021-03-11 ENCOUNTER — TELEPHONE (OUTPATIENT)
Dept: PSYCHOLOGY | Facility: CLINIC | Age: 26
End: 2021-03-11

## 2021-03-11 ENCOUNTER — FCC EXTENDED DOCUMENTATION (OUTPATIENT)
Dept: PSYCHOLOGY | Facility: CLINIC | Age: 26
End: 2021-03-11

## 2021-03-11 NOTE — TELEPHONE ENCOUNTER
"3/11/2021 1:50PM-2:10PM Consulted with patient's partner, Cordelia Mazariegos.    Patient's partner reported that he struggles to pay attention for longer than 5 minutes when he is in a conversation with someone.     Patient's partner reported that when he was in graduate school, he often struggled with procrastination and forgetting equipment that he needed to complete his assignments. Moreover, the patient frequently forgot to bring his lunch to work, which would \"cause arguments\" for the patient and his partner.     Patient's partner reported that he is usually 2-3 minutes late leaving their home, due to procrastinating with getting ready to leave. She reported that they tend to arrive 15-20 minutes late when they are meeting friends.     Patient's partner reported that he tends to experience difficulty with following through with completing chores. She said that the lack of follow through often triggers the patient feeling down about himself. Patient's partner reported that he often begins chores, but does not finish the chores. For example, the laundry will sit in the washing machine for 2-3 days. Patient's partner reported that he tends to appear overwhelmed with starting large projects, such as unpacking moving boxes. Patient's partner said that she says there is a \"[place] of trash around him,\" because he often forgets to clean up items, such as bags of food.     Patient's partner reported that he struggles with timing multiple components of a dinner to be ready at the same time. He also struggles to purchase items on the grocery list and often offers to go back to the store for the items that he forgot to purchase.     Patient's partner reported that he shows emotions \"immensely\" more than the average person and experiences \"pretty intense\" road rage. Moreover, he has a history of punching holes in walls and punching doors when he is upset.     Patient's partner reported that he tends to struggle with showering " "more than once a week. She said that he has \"regressed to severely picking at his arms and legs.\" She expressed concern that he is not caring for the \"severe scrapes\" well enough. She described the wounds as the size of a quarter and as deep as the eraser of a pencil.     Swapna Hernandes PsyD LP 3/11/2021     "

## 2021-03-12 NOTE — PROGRESS NOTES
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 succeed in treatment: commitment to health and well being and intelligence. Things that may interfere with the patient's success in treatment include: none identified.      Personal and Family Medical History:   Patient does report a family history of mental health concerns.  Patient reports family history is not on file. Patient reported that his father has a history of anxiety. Patient reported that one of his sisters and his mother have had anxiety since the COVID-19 pandemic. Patient reported that his other sister has \"severe depression.\"      Patient does report Mental Health Diagnosis and/or Treatment.  Patient reported the following previous diagnoses which include(s): an Anxiety Disorder and Depression.  Patient reported symptoms began for depression when he was approximately 15 years of age. He said that he experienced \"a decent amount of bullying\" due to his weight.  Patient reported that his depression has been on and off since the age of 15 and his most recent episode of depression began approximately March 2020 when the COVID-19 pandemic started. Patient reported that he feels like he is coming out of his current episode of depression. Patient reported that his current depression is related to \"job issues that aren't be resolved.\"  Patient reported that his anxiety " "began around the age of 11 and was due to bullying and not wanting to go to school. Patient has received mental health services in the past: therapy with Roberto with the [facility] and the [facility] and primary care provider at [facility] with Yamel Mckay FNP  . He is currently in therapy with DEEJAY García through [facility]. Psychiatric Hospitalizations: None.  Patient denies a history of civil commitment.  Currently, patient is receiving other mental health services.  These include psychotherapy with DEEJAY García and primary care provider at [facility].  For follow-up on TBD.         Patient has not had a physical exam to rule out medical causes for current symptoms.  Date of last physical exam was greater than a year ago and client was encouraged to schedule an exam with PCP. The patient has a [facility] Provider, who is named No primary care provider on file...  Patient reports the following current medical concerns: weight and eating habits.  Patient denies any issues with pain..   There are significant appetite / nutritional concerns / weight changes. Patient reported a history of weight management issues. Patient reported that he \"neglects\" to eat and then later \"binges.\"  Patient does report a history of head injury / trauma / cognitive impairment.  Patient reported that he had a concussion at the age of 14. He said that he was doing wall sits and his feet slipped out from under him due to wet shoes. As a result, his head his the wall. Patient reported that the concussion was \"mild\" and he reported that he fully recovered.      Patient reports current meds as:   Bupropion 300mg once a day  Buspirone 15mg 3 times a day  Ativan 1/2 mg PRN for anxiety attacks. He reported that he takes one Ativan approximately every two weeks.  Albuterol for exercise induced asthma.         Medication Adherence:  Patient reports taking prescribed medications as " prescribed.        Rating Scales:     PHQ9:    PHQ-9 SCORE 2/21/2021 2/21/2021   PHQ-9 Total Score MyChart - 11 (Moderate depression)   PHQ-9 Total Score 11 11   ;    GAD7:    SHAUN-7 SCORE 2/21/2021 2/21/2021   Total Score - 9 (mild anxiety)   Total Score 9 9      CGI:     First:No data recorded;               Most recentNo data recorded     Substance Use:  Patient did not report a family history of substance use concerns; see medical history section for details.  Patient has not received chemical dependency treatment in the past.  Patient has not ever been to detox.  Patient was arrested for possession of marijuana, but the charges were dropped and no treatment required.     Patient is not currently receiving any chemical dependency treatment. Patient reported the following problems as a result of their substance use: no issues.     Patient reports using alcohol 2 times per month and has 6 beers at a time. Patient first started drinking at age 19.  Patient reported date of last use was at least 2 or 3 weeks ago.  Patient reports heaviest use was undergraduate college.  Patient denies using tobacco.  Patient reports using marijuana 1 times per every three months and smokes 1 at a time. Patient started using marijuana at age 18.  Patient reports last use was about one month ago.  Patient reports heaviest use was undergraduate college.  Patient reports using caffeine 1 times per day and drinks 2 at a time. Patient started using caffeine at age 20.  Patient reports using/abusing the following substance(s). Patient reported no other substance use.      CAGE- AID:    CAGE-AID Total Score 2/21/2021   Total Score 0   Total Score MyChart 0 (A total score of 2 or greater is considered clinically significant)         Substance Use: No symptoms     Based on the negative CAGE score and clinical interview there  are not indications of drug or alcohol abuse.     Significant Losses / Trauma / Abuse / Neglect Issues:   Patient did  not serve in the .  There are indications or report of significant loss, trauma, abuse or neglect issues related to: none. Patient was bullied in middle school and high school due to weight  Concerns for possible neglect are not present.      Safety Assessment:   Current Safety Concerns:  [place] Suicide Severity Rating Scale (Lifetime/Recent)  [place] Suicide Severity Rating (Lifetime/Recent) 2/22/2021   1. Wish to be Dead (Lifetime) No   Aborted or Self-Interrupted Attempt (Lifetime) No      Patient denies current homicidal ideation and behaviors.  Patient denies current self-injurious ideation and behaviors.    Patient denied risk behaviors associated with substance use.  Patient denies any high risk behaviors associated with mental health symptoms.  Patient reports the following current concerns for their personal safety: None.  Patient reports there are  firearms in the house. The firearms are secured in a locked space.      History of Safety Concerns:  Patient denied a history of homicidal ideation.     Patient denied a history of personal safety concerns.    Patient denied a history of assaultive behaviors.    Patient denied a history of sexual assault behaviors.     Patient denied a history of risk behaviors associated with substance use.  Patient denies any history of high risk behaviors associated with mental health symptoms.  Patient reports the following protective factors: positive relationships positive social network and positive family connections, safe and stable environment, adherence with prescribed medication, living with other people and daily obligations     Risk Plan:  See Recommendations for Safety and Risk Management Plan     Review of Symptoms per patient report:  Depression:     Lack of interest, Excessive or inappropriate guilt, Change in energy level, Difficulties concentrating, Change in appetite, Psychomotor slowing or agitation, Irritability and Feeling sad, down, or  depressed  [place]ia:             No Symptoms  Psychosis:       No Symptoms  Anxiety:           Excessive worry, Nervousness, Poor concentration and Irritability  Panic:              No symptoms  Post Traumatic Stress Disorder:  No Symptoms   Eating Disorder:          No Symptoms  ADD / ADHD:              Inattentive, Difficulties listening, Poor task completion, Poor organizational skills, Distractibility, Forgetful, Interrupts and Impulsive  Conduct Disorder:       No symptoms  Autism Spectrum Disorder:     No symptoms  Obsessive Compulsive Disorder:       No Symptoms     Patient reports the following compulsive behaviors and treatment history: Picking - has not had treatment. and Pornography - has not had treatment..       Diagnostic Criteria:   A. Excessive anxiety and worry about a number of events or activities (such as work or school performance).   B. The person finds it difficult to control the worry.  C. Select 3 or more symptoms (required for diagnosis). Only one item is required in children.   - Restlessness or feeling keyed up or on edge.    - Being easily fatigued.    - Difficulty concentrating or mind going blank.    - Irritability.   D. The focus of the anxiety and worry is not confined to features of an Axis I disorder.  E. The anxiety, worry, or physical symptoms cause clinically significant distress or impairment in social, occupational, or other important areas of functioning.   F. The disturbance is not due to the direct physiological effects of a substance (e.g., a drug of abuse, a medication) or a general medical condition (e.g., hyperthyroidism) and does not occur exclusively during a Mood Disorder, a Psychotic Disorder, or a Pervasive Developmental Disorder.    - The aformentioned symptoms began 14 year(s) ago and occurs 7 days per week and is experienced as mild.  A) Recurrent episode(s) - symptoms have been present during the same 2-week period and represent a change from previous functioning  5 or more symptoms (required for diagnosis)   - Depressed mood. Note: In children and adolescents, can be irritable mood.     - Diminished interest or pleasure in all, or almost all, activities.    - Significant weight gain increase in appetite.    - Psychomotor activity agitation.    - Fatigue or loss of energy.    - Feelings of worthlessness or inappropriate and excessive guilt.    - Diminished ability to think or concentrate, or indecisiveness.   B) The symptoms cause clinically significant distress or impairment in social, occupational, or other important areas of functioning  C) The episode is not attributable to the physiological effects of a substance or to another medical condition  D) The occurence of major depressive episode is not better explained by other thought / psychotic disorders  E) There has never been a manic episode or hypomanic episode     Functional Status:  Patient reports the following functional impairments: academic performance, health maintenance, home life with girlfriend, management of the household and or completion of tasks, operation of a motor vehicle, organization, relationship(s), self-care, social interactions and work / vocational responsibilities.        WHODAS:   WHODAS 2.0 Total Score 2/21/2021 2/21/2021   Total Score 44 44   Total Score MyChart - 44          In conclusion, results of testing were significant for inattention, hyperactivity/impulsivity, depression, and anxiety. Rating scales suggested that the client is currently experiencing symptoms of an inattention and hyperactivity/impulsivity that have been present to some extent since childhood. The client endorsed moderate overall symptoms of inattention and hyperactivity-impulsivity. The client endorsed inattention more often than he did hyperactivity/impulsivity. The client and his collateral reported that the client has had attention issues since childhood. The client reported scores of borderline to mild symptoms  in childhood; however, his collateral reported more symptoms for the patient.  The client reported scores of mild functional impairment. This means that the client has difficulty functioning in his current roles. He tends to struggle most with home-chores, work, marriage/cohabiting/dating, daily responsibilities, self-care routines and health maintenance. Patient reported scores of moderate to severe executive functioning deficits. He tends to struggle with self-management to time, self-organization/problem-solving,  self-restraint, self-motivation, and self-regulation of emotions. Personality testing was suggestive of the client experiencing trouble with concentration, depression, anxiety, and obsessive behaviors. Additionally, the client is experiencing symptoms of anxiety and depression, which were reported on questionnaires.        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 Schedule an appointment with your physician to discuss a medication evaluation.  2. Access resources through websites, books, and articles such as those provided in the handout.  3. Consider working with an ADHD  or individual therapist to learn skills to  assist with symptom management, as well as ways to improve relationships,  etc that may have been impacted by your symptoms.  4. Consider attending workshops or support groups through [employer] ([employer]).  5. Individual therapy is recommended for the treatment of anxiety and depression. Therapies focusing on identifying and challenging problematic thought  processes can be beneficial.  6. Individual therapy is recommend for the treatment of Excoriation. Referred to: [facility]: [phone], Jigar Barajas, [facility] [phone].  7. Schedule a follow-up appointment with me in about six weeks to review symptoms, treatment involvement, and struggles and/or successes.      Swapna Hernandes PsyD LP   3/18/2021     Psychological Testing   Billing/Services Summary       Testing Evaluation Services Base: 43499  (1st 60 mins) Add-on: 53324  (each addtl 60 mins)   Record Review and Clarify Referral Question   2/22/2021 6:55-7:00AM  3/8/2021  4:00-4:05PM  Grade Transcript Review 10 minutes   Clinical Decision Making/Battery Modification   3/11/2021 1:50PM-2:10PM Consulted with patient's partner 20 minutes   Integration/Report Generation   3/4/2021 9:40-10:40AM Lianne (60)  3/8/2021 3:15-4:00PM MMPI (45)  3/11/2021 8:45-9:05PM (20), 3/18/2021 1:25-1:40PM (15)  Integrated report (35) 140 minutes   Interactive Feedback Session  3/18/2021 12:00-12:47PM 47 minutes   Post-Service Work   3/18/2021 1:05-1:25PM 20 minutes   Total Time: 237 minutes (3 hours, 57 minutes)   Total Units: 1 3           Diagnosis(es): (ICD-10) Attention-Deficit/Hyperactivity Disorder  314.01 (F90.2) Combined presentation; 296.31 (F33.0) Major Depressive Disorder, Recurrent Episode, Mild With anxious distress  300.02 (F41.1) Generalized Anxiety Disorder;  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

## 2021-03-18 ENCOUNTER — DOCUMENTATION ONLY (OUTPATIENT)
Dept: PSYCHOLOGY | Facility: CLINIC | Age: 26
End: 2021-03-18
Payer: COMMERCIAL

## 2021-03-18 ENCOUNTER — VIRTUAL VISIT (OUTPATIENT)
Dept: PSYCHOLOGY | Facility: CLINIC | Age: 26
End: 2021-03-18
Payer: COMMERCIAL

## 2021-03-18 DIAGNOSIS — F33.0 MAJOR DEPRESSIVE DISORDER, RECURRENT EPISODE, MILD WITH ANXIOUS DISTRESS (H): ICD-10-CM

## 2021-03-18 DIAGNOSIS — L98.1 EXCORIATION, NEUROTIC: ICD-10-CM

## 2021-03-18 DIAGNOSIS — F41.1 GENERALIZED ANXIETY DISORDER: ICD-10-CM

## 2021-03-18 DIAGNOSIS — F90.2 ATTENTION DEFICIT HYPERACTIVITY DISORDER (ADHD), COMBINED TYPE: Primary | ICD-10-CM

## 2021-03-18 PROCEDURE — 96130 PSYCL TST EVAL PHYS/QHP 1ST: CPT | Mod: 95 | Performed by: PSYCHOLOGIST

## 2021-03-18 PROCEDURE — 96131 PSYCL TST EVAL PHYS/QHP EA: CPT | Mod: 95 | Performed by: PSYCHOLOGIST

## 2021-03-18 NOTE — PROGRESS NOTES
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 help them succeed in treatment: commitment to health and well being and intelligence. Things that may interfere with the patient's success in treatment include: none identified.      Personal and Family Medical History:   Patient does report a family history of mental health concerns.  Patient reports family history is not on file. Patient reported that his father has a history of anxiety. Patient reported that one of his sisters and his mother have had anxiety since the COVID-19 pandemic. Patient reported that his other sister has \"severe depression.\"      Patient does report Mental Health Diagnosis and/or Treatment.  Patient reported the following previous diagnoses which include(s): an Anxiety Disorder and Depression.  Patient reported symptoms began for depression when he was approximately 15 years of age. He said that he experienced \"a decent amount of bullying\" due to his weight.  Patient reported that his depression has been on and off since the age of 15 and his most recent episode of depression began approximately March 2020 when the COVID-19 pandemic started. Patient reported that he feels like he is coming out of his current episode of depression. Patient reported that his current depression is related to \"job issues that aren't be resolved.\"  Patient reported that his anxiety " "began around the age of 11 and was due to bullying and not wanting to go to school. Patient has received mental health services in the past: therapy with Roberto with the [facility] and [facility] and primary care provider at [facility] with Yamel Mckay FNP  . He is currently in therapy with DEEJAY García through [facility]. Psychiatric Hospitalizations: None.  Patient denies a history of civil commitment.  Currently, patient is receiving other mental health services.  These include psychotherapy with DEEJAY García and primary care provider at [facility].  For follow-up on TBD.         Patient has not had a physical exam to rule out medical causes for current symptoms.  Date of last physical exam was greater than a year ago and client was encouraged to schedule an exam with PCP. The patient has a [facility] Primary Care Provider, who is named No primary care provider on file...  Patient reports the following current medical concerns: weight and eating habits.  Patient denies any issues with pain..   There are significant appetite / nutritional concerns / weight changes. Patient reported a history of weight management issues. Patient reported that he \"neglects\" to eat and then later \"binges.\"  Patient does report a history of head injury / trauma / cognitive impairment.  Patient reported that he had a concussion at the age of 14. He said that he was doing wall sits and his feet slipped out from under him due to wet shoes. As a result, his head his the wall. Patient reported that the concussion was \"mild\" and he reported that he fully recovered.      Patient reports current meds as:   Bupropion 300mg once a day  Buspirone 15mg 3 times a day  Ativan 1/2 mg PRN for anxiety attacks. He reported that he takes one Ativan approximately every two weeks.  Albuterol for exercise induced asthma.         Medication Adherence:  Patient reports taking prescribed medications as " prescribed.        Rating Scales:     PHQ9:    PHQ-9 SCORE 2/21/2021 2/21/2021   PHQ-9 Total Score MyChart - 11 (Moderate depression)   PHQ-9 Total Score 11 11   ;    GAD7:    SHAUN-7 SCORE 2/21/2021 2/21/2021   Total Score - 9 (mild anxiety)   Total Score 9 9      CGI:     First:No data recorded;               Most recentNo data recorded     Substance Use:  Patient did not report a family history of substance use concerns; see medical history section for details.  Patient has not received chemical dependency treatment in the past.  Patient has not ever been to detox.  Patient was arrested for possession of marijuana, but the charges were dropped and no treatment required.     Patient is not currently receiving any chemical dependency treatment. Patient reported the following problems as a result of their substance use: no issues.     Patient reports using alcohol 2 times per month and has 6 beers at a time. Patient first started drinking at age 19.  Patient reported date of last use was at least 2 or 3 weeks ago.  Patient reports heaviest use was undergraduate college.  Patient denies using tobacco.  Patient reports using marijuana 1 times per every three months and smokes 1 at a time. Patient started using marijuana at age 18.  Patient reports last use was about one month ago.  Patient reports heaviest use was undergraduate college.  Patient reports using caffeine 1 times per day and drinks 2 at a time. Patient started using caffeine at age 20.  Patient reports using/abusing the following substance(s). Patient reported no other substance use.      CAGE- AID:    CAGE-AID Total Score 2/21/2021   Total Score 0   Total Score MyChart 0 (A total score of 2 or greater is considered clinically significant)         Substance Use: No symptoms     Based on the negative CAGE score and clinical interview there  are not indications of drug or alcohol abuse.     Significant Losses / Trauma / Abuse / Neglect Issues:   Patient did  not serve in the .  There are indications or report of significant loss, trauma, abuse or neglect issues related to: none. Patient was bullied in middle school and high school due to weight  Concerns for possible neglect are not present.      Safety Assessment:   Current Safety Concerns:  [place] Suicide Severity Rating Scale (Lifetime/Recent)  [place] Suicide Severity Rating (Lifetime/Recent) 2/22/2021   1. Wish to be Dead (Lifetime) No   Aborted or Self-Interrupted Attempt (Lifetime) No      Patient denies current homicidal ideation and behaviors.  Patient denies current self-injurious ideation and behaviors.    Patient denied risk behaviors associated with substance use.  Patient denies any high risk behaviors associated with mental health symptoms.  Patient reports the following current concerns for their personal safety: None.  Patient reports there are  firearms in the house. The firearms are secured in a locked space.      History of Safety Concerns:  Patient denied a history of homicidal ideation.     Patient denied a history of personal safety concerns.    Patient denied a history of assaultive behaviors.    Patient denied a history of sexual assault behaviors.     Patient denied a history of risk behaviors associated with substance use.  Patient denies any history of high risk behaviors associated with mental health symptoms.  Patient reports the following protective factors: positive relationships positive social network and positive family connections, safe and stable environment, adherence with prescribed medication, living with other people and daily obligations     Risk Plan:  See Recommendations for Safety and Risk Management Plan     Review of Symptoms per patient report:  Depression:     Lack of interest, Excessive or inappropriate guilt, Change in energy level, Difficulties concentrating, Change in appetite, Psychomotor slowing or agitation, Irritability and Feeling sad, down, or  depressed  [place]ia:             No Symptoms  Psychosis:       No Symptoms  Anxiety:           Excessive worry, Nervousness, Poor concentration and Irritability  Panic:              No symptoms  Post Traumatic Stress Disorder:  No Symptoms   Eating Disorder:          No Symptoms  ADD / ADHD:              Inattentive, Difficulties listening, Poor task completion, Poor organizational skills, Distractibility, Forgetful, Interrupts and Impulsive  Conduct Disorder:       No symptoms  Autism Spectrum Disorder:     No symptoms  Obsessive Compulsive Disorder:       No Symptoms     Patient reports the following compulsive behaviors and treatment history: Picking - has not had treatment. and Pornography - has not had treatment..       Diagnostic Criteria:   A. Excessive anxiety and worry about a number of events or activities (such as work or school performance).   B. The person finds it difficult to control the worry.  C. Select 3 or more symptoms (required for diagnosis). Only one item is required in children.   - Restlessness or feeling keyed up or on edge.    - Being easily fatigued.    - Difficulty concentrating or mind going blank.    - Irritability.   D. The focus of the anxiety and worry is not confined to features of an Axis I disorder.  E. The anxiety, worry, or physical symptoms cause clinically significant distress or impairment in social, occupational, or other important areas of functioning.   F. The disturbance is not due to the direct physiological effects of a substance (e.g., a drug of abuse, a medication) or a general medical condition (e.g., hyperthyroidism) and does not occur exclusively during a Mood Disorder, a Psychotic Disorder, or a Pervasive Developmental Disorder.    - The aformentioned symptoms began 14 year(s) ago and occurs 7 days per week and is experienced as mild.  A) Recurrent episode(s) - symptoms have been present during the same 2-week period and represent a change from previous functioning  5 or more symptoms (required for diagnosis)   - Depressed mood. Note: In children and adolescents, can be irritable mood.     - Diminished interest or pleasure in all, or almost all, activities.    - Significant weight gain increase in appetite.    - Psychomotor activity agitation.    - Fatigue or loss of energy.    - Feelings of worthlessness or inappropriate and excessive guilt.    - Diminished ability to think or concentrate, or indecisiveness.   B) The symptoms cause clinically significant distress or impairment in social, occupational, or other important areas of functioning  C) The episode is not attributable to the physiological effects of a substance or to another medical condition  D) The occurence of major depressive episode is not better explained by other thought / psychotic disorders  E) There has never been a manic episode or hypomanic episode     Functional Status:  Patient reports the following functional impairments: academic performance, health maintenance, home life with girlfriend, management of the household and or completion of tasks, operation of a motor vehicle, organization, relationship(s), self-care, social interactions and work / vocational responsibilities.        WHODAS:   WHODAS 2.0 Total Score 2/21/2021 2/21/2021   Total Score 44 44   Total Score MyChart - 44          In conclusion, results of testing were significant for inattention, hyperactivity/impulsivity, depression, and anxiety. Rating scales suggested that the client is currently experiencing symptoms of an inattention and hyperactivity/impulsivity that have been present to some extent since childhood. The client endorsed moderate overall symptoms of inattention and hyperactivity-impulsivity. The client endorsed inattention more often than he did hyperactivity/impulsivity. The client and his collateral reported that the client has had attention issues since childhood. The client reported scores of borderline to mild symptoms  in childhood; however, his collateral reported more symptoms for the patient.  The client reported scores of mild functional impairment. This means that the client has difficulty functioning in his current roles. He tends to struggle most with home-chores, work, marriage/cohabiting/dating, daily responsibilities, self-care routines and health maintenance. Patient reported scores of moderate to severe executive functioning deficits. He tends to struggle with self-management to time, self-organization/problem-solving,  self-restraint, self-motivation, and self-regulation of emotions. Personality testing was suggestive of the client experiencing trouble with concentration, depression, anxiety, and obsessive behaviors. Additionally, the client is experiencing symptoms of anxiety and depression, which were reported on questionnaires.        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 Schedule an appointment with your physician to discuss a medication evaluation.  2. Access resources through websites, books, and articles such as those provided in the handout.  3. Consider working with an ADHD  or individual therapist to learn skills to  assist with symptom management, as well as ways to improve relationships,  etc that may have been impacted by your symptoms.  4. Consider attending workshops or support groups through [facility] ([employer]).  5. Individual therapy is recommended for the treatment of anxiety and depression. Therapies focusing on identifying and challenging problematic thought  processes can be beneficial.  6. Individual therapy is recommend for the treatment of Excoriation. Referred to: [facility]: [phone], Jigar Barajas, [facility] [phone].  7. Schedule a follow-up appointment with me in about six weeks to review symptoms, treatment involvement, and struggles and/or successes.      Swapna Hernandes PsyD LP   3/18/2021     Psychological Testing   Billing/Services Summary       Testing Evaluation Services Base: 14860  (1st 60 mins) Add-on: 99549  (each addtl 60 mins)   Record Review and Clarify Referral Question   2/22/2021 6:55-7:00AM  3/8/2021  4:00-4:05PM  Grade Transcript Review 10 minutes   Clinical Decision Making/Battery Modification   3/11/2021 1:50PM-2:10PM Consulted with patient's partner 20 minutes   Integration/Report Generation   3/4/2021 9:40-10:40AM Lianne (60)  3/8/2021 3:15-4:00PM MMPI (45)  3/11/2021 8:45-9:05PM (20), 3/18/2021 1:25-1:40PM (15)  Integrated report (35) 140 minutes   Interactive Feedback Session  3/18/2021 12:00-12:47PM 47 minutes   Post-Service Work   3/18/2021 1:05-1:25PM 20 minutes   Total Time: 237 minutes (3 hours, 57 minutes)   Total Units: 1 3           Diagnosis(es): (ICD-10) Attention-Deficit/Hyperactivity Disorder  314.01 (F90.2) Combined presentation; 296.31 (F33.0) Major Depressive Disorder, Recurrent Episode, Mild With anxious distress  300.02 (F41.1) Generalized Anxiety Disorder;  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

## 2021-03-18 NOTE — PROGRESS NOTES
Progress Note    Patient Name: Jose D Dickens III   Date: 3/18/2021          Service Type: Individualfor ADHD Feedback      Session Start Time: 12:00PM  Session End Time: 12:47PM     Session Length: 47 minutes    Session #: ADHD Feedback    Attendees: Client attended alone    Service Modality:  Video Visit:      Provider verified identity through the following two step process.  Patient provided:  Patient     Telemedicine Visit: The patient's condition can be safely assessed and treated via synchronous audio and visual telemedicine encounter.      Reason for Telemedicine Visit: Services only offered telehealth    Originating Site (Patient Location): Patient's home    Distant Site (Provider Location): Provider Remote Setting    Consent:  The patient/guardian has verbally consented to: the potential risks and benefits of telemedicine (video visit) versus in person care; bill my insurance or make self-payment for services provided; and responsibility for payment of non-covered services.     Patient would like the video invitation sent by:  Send to e-mail at: [email or web address]    Mode of Communication:  Video Conference via [employer]    As the provider I attest to compliance with applicable laws and regulations related to telemedicine.       PHQ-9 / SHAUN-7 :9/15    DATA  Interactive Complexity: No  Crisis: No       Progress Since Last Session (Related to Symptoms / Goals / Homework):   Symptoms: Worsening for anxiety    Homework: Achieved / completed to satisfaction      Episode of Care Goals: Achieved / completed to satisfaction - MAINTENANCE (Working to maintain change, with risk of relapse); Intervened by continuing to positively reinforce healthy behavior choice      Current / Ongoing Stressors and Concerns:   Work Related Stress   Stress with unpacking boxes and settling into his new home     Treatment Objective(s) Addressed in This Session:     Provided feedback  "on ADHD evaluation. Reviewed test results in depth and answered Patient's questions. 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 This provider also completed full written report of evaluation, including integration of testing data, summary, and recommendations. Please see Documentation Only dated 3/18/2021.     Intervention:   ADHD Evaluation feedback; Reviewed report (can be found in Documentation Only encounter dated 3/18/2021); Patient was appreciative of the feedback and expressed understanding of the diagnoses. He stated, \"I m feeling pretty good. It is good to know there is something wrong and help.\"    Discussed patient's tendency to procrastinate with getting ready to leave his home. Patient acknowledged that he likely needs 10 minutes to get ready as opposed to the 5 minutes that he allows himself.     Patient reported that he tends to spend approximately 5-10 minutes a day picking at his skin wounds and then stops when he notices that he is picking. Patient reported that he tends to pick at the wounds on his legs. Encouraged patient to wear pants as opposed to shorts.     Patient expressed concern about his weight and family history of diabetes. Discussed patient committing to an exercise plan. Patient reported that he used to do  weight lifting 4 days a week and cardio 4 days a week. He reported that he lost approximately 50 pounds and maintained the loss for approximately one year. Patient reported that he is currently 6'6'' and weighs approximately 350 pounds. Discussed a referral for a nutritionist. However, he reported that he would like to start with exercising. Patient plans to walk from 5:30-6:30pm three days a week.   "     Patient reported that his current stressors are completing the ADHD assessment, financial stress, and his physical health. Patient acknowledged that is financial stress may not be realistic.       CBT: socratic questioning, thought challenging, cognitive reframe, positive reinforcement   EFT: emotion checking         ASSESSMENT: Current Emotional / Mental Status (status of significant symptoms):   Risk status (Self / Other harm or suicidal ideation)   Patient denies current fears or concerns for personal safety.   Patient denies current or recent suicidal ideation or behaviors.   Patientdenies current or recent homicidal ideation or behaviors.   Patient denies current or recent self injurious behavior or ideation.   Patient denies other safety concerns.   Patient reports there has been no change in risk factors since their last session.     Patientreports there has been no change in protective factors since their last session.     Recommended that patient call 911 or go to the local ED should there be a change in any of these risk factors.     Appearance:   Appropriate    Eye Contact:   Good    Psychomotor Behavior: Normal    Attitude:   Cooperative    Orientation:   All   Speech    Rate / Production: Normal     Volume:  Normal    Mood:    Normal   Affect:    Appropriate    Thought Content:  Clear    Thought Form:  Coherent  Logical    Insight:    Good      Medication Review:   No current psychiatric medications prescribed     Medication Compliance:   NA     Changes in Health Issues:   None reported     Chemical Use Review:   Substance Use: Chemical use reviewed, no active concerns identified Patient reported that he last used marijuana approximately 2 weeks ago.      Tobacco Use: No current tobacco use.      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 Patient plans to try to be ready 30 minutes prior to needing to leave the home. Patient plans to walk from 5:30-6:30pm three days a week.  Patient plans to wear pants as opposed to shorts to help with limiting skin picking behaviors.     Recommendations:     1. Schedule an appointment with your physician to discuss a medication evaluation.  2. Access resources through websites, books, and articles such as those provided in the handout.  3. Consider working with an ADHD  or individual therapist to learn skills to  assist with symptom management, as well as ways to improve relationships,  etc that may have been impacted by your symptoms.  4. Consider attending workshops or support groups through [employer] ([employer]).  5. Individual therapy is recommended for the treatment of anxiety and depression. Therapies focusing on identifying and challenging problematic thought processes can be beneficial.  6. Individual therapy is recommend for the treatment of Excoriation. Referred to: [facility]: [phone], Jigar Barajas, [facility] [phone].  7. Schedule a follow-up appointment with me in about six weeks to review symptoms, treatment involvement, and struggles and/or successes.        Swapna Hernandes PsyD LP 3/18/2021                                                        Psychological Testing   Billing/Services Summary       Testing Evaluation Services Base: 36470  (1st 60 mins) Add-on: 61927  (each addtl 60 mins)   Record Review and Clarify Referral Question   2/22/2021 6:55-7:00AM  3/8/2021  4:00-4:05PM  Grade Transcript Review 10 minutes   Clinical Decision Making/Battery Modification   3/11/2021 1:50PM-2:10PM  Consulted with patient's partner 20 minutes   Integration/Report Generation   3/4/2021 9:40-10:40AM Lianne (60)  3/8/2021 3:15-4:00PM MMPI (45)  3/11/2021 8:45-9:05PM (20), 3/18/2021 1:25-1:40PM (15)  Integrated report (35) 140 minutes   Interactive Feedback Session  3/18/2021 12:00-12:47PM 47 minutes   Post-Service Work   3/18/2021 1:05-1:25PM 20 minutes   Total Time: 237 minutes (3 hours, 57 minutes)   Total Units: 1 3           Diagnosis(es): (ICD-10) Attention-Deficit/Hyperactivity Disorder  314.01 (F90.2) Combined presentation; 296.31 (F33.0) Major Depressive Disorder, Recurrent Episode, Mild With anxious distress  300.02 (F41.1) Generalized Anxiety Disorder;  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

## 2021-04-22 ENCOUNTER — COMMUNICATION - HEALTHEAST (OUTPATIENT)
Dept: FAMILY MEDICINE | Facility: CLINIC | Age: 26
End: 2021-04-22

## 2021-04-22 DIAGNOSIS — F41.1 GAD (GENERALIZED ANXIETY DISORDER): ICD-10-CM

## 2021-04-22 DIAGNOSIS — F33.1 MODERATE EPISODE OF RECURRENT MAJOR DEPRESSIVE DISORDER (H): ICD-10-CM

## 2021-04-22 RX ORDER — BUSPIRONE HYDROCHLORIDE 15 MG/1
15 TABLET ORAL 3 TIMES DAILY
Qty: 90 TABLET | Refills: 0 | Status: SHIPPED | OUTPATIENT
Start: 2021-04-22

## 2021-05-26 ASSESSMENT — PATIENT HEALTH QUESTIONNAIRE - PHQ9
SUM OF ALL RESPONSES TO PHQ QUESTIONS 1-9: 9
SUM OF ALL RESPONSES TO PHQ QUESTIONS 1-9: 6

## 2021-05-27 ASSESSMENT — PATIENT HEALTH QUESTIONNAIRE - PHQ9
SUM OF ALL RESPONSES TO PHQ QUESTIONS 1-9: 4
SUM OF ALL RESPONSES TO PHQ QUESTIONS 1-9: 3

## 2021-05-28 ASSESSMENT — ANXIETY QUESTIONNAIRES
GAD7 TOTAL SCORE: 9
GAD7 TOTAL SCORE: 11
GAD7 TOTAL SCORE: 12
GAD7 TOTAL SCORE: 10
GAD7 TOTAL SCORE: 21

## 2021-05-28 ASSESSMENT — ASTHMA QUESTIONNAIRES: ACT_TOTALSCORE: 21

## 2021-06-04 VITALS
SYSTOLIC BLOOD PRESSURE: 124 MMHG | HEART RATE: 114 BPM | BODY MASS INDEX: 42.39 KG/M2 | WEIGHT: 315 LBS | DIASTOLIC BLOOD PRESSURE: 84 MMHG

## 2021-06-04 VITALS
HEART RATE: 97 BPM | BODY MASS INDEX: 43.02 KG/M2 | WEIGHT: 315 LBS | SYSTOLIC BLOOD PRESSURE: 138 MMHG | DIASTOLIC BLOOD PRESSURE: 81 MMHG | OXYGEN SATURATION: 98 %

## 2021-06-04 VITALS
SYSTOLIC BLOOD PRESSURE: 140 MMHG | WEIGHT: 315 LBS | DIASTOLIC BLOOD PRESSURE: 70 MMHG | HEART RATE: 84 BPM | BODY MASS INDEX: 41.67 KG/M2 | OXYGEN SATURATION: 95 %

## 2021-06-04 VITALS
DIASTOLIC BLOOD PRESSURE: 77 MMHG | WEIGHT: 315 LBS | HEIGHT: 78 IN | HEART RATE: 100 BPM | BODY MASS INDEX: 36.45 KG/M2 | OXYGEN SATURATION: 97 % | SYSTOLIC BLOOD PRESSURE: 136 MMHG

## 2021-06-06 NOTE — PROGRESS NOTES
Assessment:   1. Moderate episode of recurrent major depressive disorder (H)  2. SHAUN (generalized anxiety disorder)  Improved symptom. Recommend increasing Wellbutrin to 450 mg daily and follow up in two weeks.   - buPROPion  mg Tb24; Take 450 mg by mouth every morning.  Dispense: 30 tablet; Refill: 1    3. Mild intermittent asthma without complication  Stable. Continue rescue inhaler before exercise.       Plan:   Medications: Wellbutrin.  Reviewed concept of anxiety as biochemical imbalance of neurotransmitters and rationale for treatment.  Instructed patient to contact office or on-call physician promptly should condition worsen or any new symptoms appear and provided on-call telephone numbers. IF THE PATIENT HAS ANY SUICIDAL OR HOMICIDAL IDEATIONS, CALL THE OFFICE, DISCUSS WITH A SUPPORT MEMBER, OR GO TO THE ER IMMEDIATELY. Patient was agreeable with this plan.  Follow up: 2 weeks.  Spent 15 minutes (>50% of visit) discussing the risks of anxiety disorder and depression, the  pathophysiology, etiology, risks, and principles of treatment.     Subjective:   Jose D Dickens III is a 25 y.o. male who presents for follow up of depression and anxiety. He reports that his symptoms did improve after starting and taking Wellbutrin for few weeks. He reports that he feels that the medication is not as active as it was initially. He is not sure if this is due to the current COVID19 situation. He also reports that his asthma has improved and he has not had issues with shortness of breath and he uses his inhaler before exercise. Patient denies psychomotor agitation, psychomotor retardation, recurrent thoughts of death, suicidal attempt, suicidal thoughts with specific plan, suicidal thoughts without plan, weight gain and weight loss. He complains of the following side effects from the treatment: none.    The following portions of the patient's history were reviewed and updated as appropriate: allergies, current  medications, past family history, past medical history, past social history, past surgical history and problem list.    Review of Systems  A 12 point comprehensive review of systems was negative except as noted.      Objective:      /70   Pulse 84   Wt (!) 356 lb (161.5 kg)   SpO2 95%   BMI 41.67 kg/m     General:  alert, appears stated age and cooperative   Affect & Behavior:  full facial expressions, good grooming, good insight, normal perception, normal reasoning, normal speech pattern and content and normal thought patterns

## 2021-06-06 NOTE — PROGRESS NOTES
Office Visit - New Patient   Jose D Dickens III   24 y.o.  male    Date of visit: 2/17/2020  Physician: Yamel Mckay CNP     Assessment and Plan   1. Encounter to establish care  2. Mild intermittent asthma without complication  Discussed diagnosis with patient and recommend using rescue inhaler before exercise and follow up for recheck.     3. Moderate episode of recurrent major depressive disorder (H)  Medications: Wellbutrin.  Labs: Comprehensive metabolic profile and TSH.  Recommended counseling.  Reviewed concept of anxiety as biochemical imbalance of neurotransmitters and rationale for treatment.  Instructed patient to contact office or on-call physician promptly should condition worsen or any new symptoms appear and provided on-call telephone numbers. IF THE PATIENT HAS ANY SUICIDAL OR HOMICIDAL IDEATIONS, CALL THE OFFICE, DISCUSS WITH A SUPPORT MEMBER, OR GO TO THE ER IMMEDIATELY. Patient was agreeable with this plan.  Follow up: 2 weeks.  Spent 25 minutes (>50% of visit) discussing the risks of anxiety disorder, the  pathophysiology, etiology, risks, and principles of treatment.     4. Class 3 severe obesity due to excess calories without serious comorbidity with body mass index (BMI) of 40.0 to 44.9 in adult (H)  The following high BMI interventions were performed this visit: encouragement to exercise and dietary management education, guidance, and counseling  - Thyroid Stimulating Hormone (TSH)  - Basic Metabolic Panel  - Lipid Cascade    No follow-ups on file.     Chief Complaint   Chief Complaint   Patient presents with     Establish Care     Depression     Anxiety     Breathing Problem        Patient Profile   Social History     Social History Narrative     Not on file        Past Medical History   Patient Active Problem List   Diagnosis     Esophageal Reflux     Depression With Anxiety     Allergies     Obesity     Obstructive Sleep Apnea     Anxiety     Depression     Asthma       Past  Surgical History  He has a past surgical history that includes pr removal of tonsils,<13 y/o; pr remove tumor,temporal bone; [place] tooth extraction; and Tonsillectomy.     History of Present Illness   This 24 y.o. old male patient with history of exercise-induced asthma, obesity, anxiety and depression presents to the clinic today to establish care.  Patient reported that he was diagnosed with exercise-induced asthma when he was 9 years old and after a couple of years he was not having symptoms until recently when he started exercising that he noticed that he is having shortness of breath and he has to take breaks to catch his breath before going back to the activity.  Patient also reported that he was diagnosed with anxiety and depression few years ago while in college and he was placed on Wellbutrin for a few years and he also had therapy and things got better and he stopped taking his medication. Patient also stopped therapy but recently with new job and life changes he notices that he is becoming more anxious and is having panic attacks.  He stated that his heart rate will start racing and he becomes sweaty and sometimes he feels that he is losing control.  He denies chest pain, shortness of breath, fever and chills.  Patient also denied any suicidal homicidal ideations.    Review of Systems: A comprehensive review of systems was negative except as noted.     Medications and Allergies   No current outpatient medications on file.     No current facility-administered medications for this visit.      No Known Allergies     Family and Social History   Family History   Problem Relation Age of Onset     Diabetes Father      Depression Sister         Social History     Tobacco Use     Smoking status: Never Smoker     Smokeless tobacco: Never Used   Substance Use Topics     Alcohol use: Yes     Alcohol/week: 7.0 standard drinks     Types: 7 Cans of beer per week     Drug use: No        Physical Exam   General  "Appearance: Well groomed    /77   Pulse 100   Ht 6' 5.5\" (1.969 m)   Wt (!) 358 lb 3 oz (162.5 kg)   SpO2 97%   BMI 41.93 kg/m      EYES: Eyelids, conjunctiva, and sclera were normal. Pupils were normal. Cornea, iris, and lens were normal bilaterally.  HEAD, EARS, NOSE, MOUTH, AND THROAT: Head and face were normal. Hearing was normal to voice and the ears were normal to external exam. Nose appearance was normal and there was no discharge. Oropharynx was normal.  NECK: Neck appearance was normal. There were no neck masses and the thyroid was not enlarged.  RESPIRATORY: Breathing pattern was normal and the chest moved symmetrically.  Percussion/auscultatory percussion was normal.  Lung sounds were normal and there were no abnormal sounds.  CARDIOVASCULAR: Heart rate and rhythm were normal.  S1 and S2 were normal and there were no extra sounds or murmurs. Peripheral pulses in arms and legs were normal.  Jugular venous pressure was normal.  There was no peripheral edema.  GASTROINTESTINAL: The abdomen was normal in contour.  Bowel sounds were present.  Percussion detected no organ enlargement or tenderness.  Palpation detected no tenderness, mass, or enlarged organs.   MUSCULOSKELETAL: Skeletal configuration was normal and muscle mass was normal for age. Joint appearance was overall normal.  LYMPHATIC: There were no enlarged nodes.  SKIN/HAIR/NAILS: Skin color was normal.  There were no skin lesions.  Hair and nails were normal.  NEUROLOGIC: The patient was alert and oriented to person, place, time, and circumstance. Speech was normal. Cranial nerves were normal. Motor strength was normal for age. The patient was normally coordinated.  PSYCHIATRIC:  Mood and affect were normal and the patient had normal recent and remote memory. The patient's judgment and insight were normal.       Additional Information     Time: total time spent with the patient was 45 minutes of which >50% was spent in counseling and " coordination of care     Yamel Mckay CNP  Family Nurse Practitioner  [facility]  [phone]  [email or web address]

## 2021-06-06 NOTE — TELEPHONE ENCOUNTER
Left message to call back for: Jose D  Information to relay to patient:  Provider would like to offer the patient a phone visit per protocol to keep patient out of clinic unless necessary.  Patient may keep appointment if it is urgent otherwise please offer a phone visit. Thank you

## 2021-06-09 NOTE — PATIENT INSTRUCTIONS - HE
Patient Education     If you have further questions regarding your plan of care, please call your provider at Phone: [phone]    If you were prescribed medication, be sure to fill your prescription and follow medication directions    If you experience any medication side effects or minor reactions, please contact us at Phone: [phone]    If you or your family member have suicidal thoughts, contact 911 or go to your nearest Emergency Room    [facility]  Adult [phone]  Child: [phone] [facility] Crisis  Adult: [phone]  Child: [phone] [facility]  [facility]   Adult/Child: [phone]   [facility] Crisis  Adult:  [phone]  Child:  [phone]     National Suicide Prevention Line:  1-283.644.5366    [facility]    [address] [place] [address]   [id number]  [phone]      Mobile Crisis Team  [facility]    Adults, 18 and older  COPE- [phone]    Children, ages 17 and younger:  Child Crisis- [phone] Mobile Crisis Team  [facility]    24/7 Mobile Team and Crisis Line  [phone]     Text [place] to [phone] and you will be connected with a counselor who will help defuse the crisis and connect you to local resources.  Crisis Text Line is available 24 hours a day, 7 days a week.                         Based on the information provided, I would recommend you come in for an appointment to discuss these symptoms. Please schedule an appointment.    You will not be charged for this eVisit.

## 2021-06-09 NOTE — PROGRESS NOTES
Assessment:   1. Moderate episode of recurrent major depressive disorder (H)  2. SHAUN (generalized anxiety disorder)  Worsened anxiety and depression after stopping medication. Recommend restarting Bupropion on a smaller dose and adding BuSpar and hydroxyzine. Answered patient's question  - busPIRone (BUSPAR) 10 MG tablet; Take 1 tablet (10 mg total) by mouth 3 (three) times a day.  Dispense: 90 tablet; Refill: 0  - hydrOXYzine pamoate (VISTARIL) 25 MG capsule; Take 1-2 capsules (25-50 mg total) by mouth 2 (two) times a day as needed for itching.  Dispense: 60 capsule; Refill: 0  - buPROPion (WELLBUTRIN XL) 300 MG 24 hr tablet; Take 1 tablet (300 mg total) by mouth every morning.  Dispense: 30 tablet; Refill: 2     Plan:   Medications: BuSpar, Wellbutrin and hydroxyzine.  Recommended counseling.  Reviewed concept of anxiety as biochemical imbalance of neurotransmitters and rationale for treatment.  Instructed patient to contact office or on-call physician promptly should condition worsen or any new symptoms appear and provided on-call telephone numbers. IF THE PATIENT HAS ANY SUICIDAL OR HOMICIDAL IDEATIONS, CALL THE OFFICE, DISCUSS WITH A SUPPORT MEMBER, OR GO TO THE ER IMMEDIATELY. Patient was agreeable with this plan.  Follow up: 3 weeks.  Spent 20 minutes (>50% of visit) discussing the risks of anxiety disorder and depression the  pathophysiology, etiology, risks, and principles of treatment.     Subjective:   Jose D Dickens III is a 25 y.o. male who presents for follow up of anxiety disorder and depression.  Patient reported that a little over a month ago he stopped taking his medication not because he ran out of his medication but because he was having adverse effects.  He reported that his anxiety and depression were better while on the medication but he was not enjoying doing things and as soon as he ran out he did not go to the pharmacy to  his refills.  He stated that since then his anxiety and  depression has worsened.  He stated that he is very anxious about everything and also depressed.  He said it has not affected his job yet but he is just not feeling great as he is worried about the pandemic and things happening around the world. Current symptoms: difficulty concentrating, feelings of losing control, racing thoughts. He denies current suicidal and homicidal ideation. He complains of the following side effects from the treatment: feeling numb.    The following portions of the patient's history were reviewed and updated as appropriate: allergies, current medications, past family history, past medical history, past social history, past surgical history and problem list.      Objective:      /84   Pulse (!) 114   Wt (!) 362 lb 1.6 oz (164.2 kg)   BMI 42.39 kg/m     General:  alert, appears stated age and cooperative   Affect/Behavior:  full facial expressions, good grooming, good insight, normal perception, normal reasoning, normal speech pattern and content and normal thought patterns

## 2021-06-10 NOTE — PROGRESS NOTES
"Jose D Dickens III is a 25 y.o. male who is being evaluated via a billable video visit.      The patient has been notified of following:     \"This video visit will be conducted via a call between you and your physician/provider. We have found that certain health care needs can be provided without the need for an in-person physical exam.  This service lets us provide the care you need with a video conversation.  If a prescription is necessary we can send it directly to your pharmacy.  If lab work is needed we can place an order for that and you can then stop by our lab to have the test done at a later time.    Video visits are billed at different rates depending on your insurance coverage. Please reach out to your insurance provider with any questions.    If during the course of the call the physician/provider feels a video visit is not appropriate, you will not be charged for this service.\"    Patient has given verbal consent to a Video visit? Yes  How would you like to obtain your AVS? AVS Preference: MyChart.  If dropped by the video visit, the video invitation should be sent to: Send to e-mail at: [email or web address]  Will anyone else be joining your video visit? No        Video Start Time: 8:20 AM    1. Moderate episode of recurrent major depressive disorder (H)  2. Panic attack  Improved anxiety and depression with continuous panic attacks. Discussed need for therapy and use of benzodiazepines. Recommend increasing Buspar and adding as needed ativan for panic attacks. Patient will follow up in two weeks.   Recommended counseling.  Reviewed concept of anxiety as biochemical imbalance of neurotransmitters and rationale for treatment.  Instructed patient to contact office or on-call physician promptly should condition worsen or any new symptoms appear and provided on-call telephone numbers. IF THE PATIENT HAS ANY SUICIDAL OR HOMICIDAL IDEATIONS, CALL THE OFFICE, DISCUSS WITH A SUPPORT MEMBER, OR GO TO THE ER " IMMEDIATELY. Patient was agreeable with this plan.  Follow up: 2 weeks.  - busPIRone (BUSPAR) 15 MG tablet; Take 1 tablet (15 mg total) by mouth 3 (three) times a day.  Dispense: 90 tablet; Refill: 0  - LORazepam (ATIVAN) 0.5 MG tablet; Take 1 tablet (0.5 mg total) by mouth every 8 (eight) hours as needed for anxiety.  Dispense: 10 tablet; Refill: 0    Subjective:   Jose D Dickens III is a 25 y.o. male who presents for follow up of depression. Current symptoms include increased anxiety with panc attacks. Symptoms have been gradually improving since that time. Patient denies psychomotor agitation, psychomotor retardation, recurrent thoughts of death, suicidal attempt, suicidal thoughts with specific plan, suicidal thoughts without plan, weight gain and weight loss. Previous treatment includes: medication. He complains of the following side effects from the treatment: none.    The following portions of the patient's history were reviewed and updated as appropriate: allergies, current medications, past family history, past medical history, past social history, past surgical history and problem list.    Review of Systems  A 12 point comprehensive review of systems was negative except as noted.      Objective:      There were no vitals taken for this visit.   General:  alert, appears stated age and cooperative   Affect & Behavior:  full facial expressions, good grooming, good insight, normal perception, normal reasoning, normal speech pattern and content and normal thought patterns            Additional provider notes:  GENERAL: Healthy, alert and no distress  EYES: Eyes grossly normal to inspection. No discharge or erythema, or obvious scleral/conjunctival abnormalities.  RESP: No audible wheeze, cough, or visible cyanosis.  No visible retractions or increased work of breathing.    NEURO: Cranial nerves grossly intact. Mentation and speech appropriate for age.  PSYCH: Mentation appears normal, affect normal/bright,  judgement and insight intact, normal speech and appearance well-groomed      Video-Visit Details    Type of service:  Video Visit    Video End Time (time video stopped): 8:38 AM  Originating Location (pt. Location): Home    Distant Location (provider location):  [facility] FAMILY MEDICINE/OB     Platform used for Video Visit: DELBERT Kulkarni

## 2021-06-10 NOTE — PROGRESS NOTES
Assessment:   1. Moderate episode of recurrent major depressive disorder (H)  2. SHAUN (generalized anxiety disorder)  Improved symptoms. Continue current medication and follow up in 4 weeks.   - busPIRone (BUSPAR) 15 MG tablet; Take 1 tablet (15 mg total) by mouth 3 (three) times a day.  Dispense: 90 tablet; Refill: 0  - hydrOXYzine pamoate (VISTARIL) 25 MG capsule; Take 1-2 capsules (25-50 mg total) by mouth 2 (two) times a day as needed for itching.  Dispense: 60 capsule; Refill: 0    3. Mild intermittent asthma without complication  - albuterol (PROAIR HFA;PROVENTIL HFA;VENTOLIN HFA) 90 mcg/actuation inhaler; Inhale 2 puffs every 4 (four) hours as needed for wheezing.  Dispense: 1 each; Refill: 3     Plan:   Medications: BuSpar and Wellbutrin.  Continue with counseling.  Reviewed concept of anxiety as biochemical imbalance of neurotransmitters and rationale for treatment.  Instructed patient to contact office or on-call physician promptly should condition worsen or any new symptoms appear and provided on-call telephone numbers. IF THE PATIENT HAS ANY SUICIDAL OR HOMICIDAL IDEATIONS, CALL THE OFFICE, DISCUSS WITH A SUPPORT MEMBER, OR GO TO THE ER IMMEDIATELY. Patient was agreeable with this plan.  Follow up: 4 weeks.     Subjective:   Jose D Dickens III is a 25 y.o. male who presents for follow up of anxiety disorder. Patient reports that his symptoms are better but he has noticed this week that half an hour before he start working he gets anxious and as soon as he start to work the anxiety goes away. He denies current suicidal and homicidal ideation. He complains of the following side effects from the treatment: none.    The following portions of the patient's history were reviewed and updated as appropriate: allergies, current medications, past family history, past medical history, past social history, past surgical history and problem list.      Objective:      /81   Pulse 97   Wt (!) 367 lb 8 oz  (166.7 kg)   SpO2 98%   BMI 43.02 kg/m     General:  alert, appears stated age and cooperative   Affect/Behavior:  full facial expressions, good grooming, good insight, normal perception, normal reasoning, normal speech pattern and content and normal thought patterns

## 2021-06-10 NOTE — TELEPHONE ENCOUNTER
busPIRone (BUSPAR) 10 MG tablet [[id number]]     Electronically signed by: Yamel Mckay FNP on 07/14/20 0812  Status: Discontinued    Ordering user: Yamel Mckay FNP 07/14/20 0812  Authorized by: Yamel Mckay FNP    Frequency: TID 07/14/20 - 08/03/20  Discontinued by: Yamel Mckay FNP 08/03/20 0857 [Reorder]

## 2021-06-11 NOTE — TELEPHONE ENCOUNTER
Refill Approved    Rx renewed per Medication Renewal Policy. Medication was last renewed on 7/14/20.    Annita Zheng, [facility] Triage/Med Refill 9/6/2020     Requested Prescriptions   Pending Prescriptions Disp Refills     buPROPion (WELLBUTRIN XL) 300 MG 24 hr tablet 30 tablet 2     Sig: Take 1 tablet (300 mg total) by mouth every morning.       Tricyclics/Misc Antidepressant/Antianxiety Meds Refill Protocol Passed - 9/3/2020  4:12 PM        Passed - PCP or prescribing provider visit in last year     Last office visit with prescriber/PCP: 8/19/2020 Yamel Mckay FNP OR same dept: 8/19/2020 Yamel Mckay FNP OR same specialty: 8/19/2020 Yamel Mckay FNP  Last physical: Visit date not found Last MTM visit: Visit date not found   Next visit within 3 mo: Visit date not found  Next physical within 3 mo: Visit date not found  Prescriber OR PCP: DELBERT Bliss  Last diagnosis associated with med order: 1. Moderate episode of recurrent major depressive disorder (H)  - buPROPion (WELLBUTRIN XL) 300 MG 24 hr tablet; Take 1 tablet (300 mg total) by mouth every morning.  Dispense: 30 tablet; Refill: 2    2. SHAUN (generalized anxiety disorder)  - buPROPion (WELLBUTRIN XL) 300 MG 24 hr tablet; Take 1 tablet (300 mg total) by mouth every morning.  Dispense: 30 tablet; Refill: 2    If protocol passes may refill for 12 months if within 3 months of last provider visit (or a total of 15 months).

## 2021-06-12 NOTE — PROGRESS NOTES
Mental Health Visit Note    Patient: Jose D Dickens III    : 1995 MRN: [id number]    10/22/2020    Start time: 1302    Stop Time: 1352   Session # 1    Session Type: Patient is presenting for an Individual session.    Jose D Dickens III is a 25 y.o. male is being seen today for    Chief Complaint   Patient presents with     Intake #1     Anxiety     Depression     Telemedicine Visit: The patient's condition can be safely assessed and treated via synchronous audio and visual telemedicine encounter.      Reason for Telemedicine Visit: Services only offered telehealth: COVID-19    Originating Site (Patient Location): Patient's home    Distant Site (Provider Location): [facility]: [facility]    Consent:  The patient/guardian has verbally consented to: the potential risks and benefits of telemedicine (video visit) versus in person care; bill my insurance or make self-payment for services provided; and responsibility for payment of non-covered services.     Mode of Communication:  Video Conference via [employer]    As the provider I attest to compliance with applicable laws and regulations related to telemedicine.    Those present for this visit patient and therapist   New symptoms or complaints: None    Functional Impairment:   Personal: 3  Family: 2  Social: 3  Work: 3    Clinical assessment of mental status:   Jose D Dickens III presented on time.   He was oriented x3, open and cooperative, and dressed appropriately for this session and weather. His memory was Normal cognitive functioning .  His speech was  Within normal.  Language was appropriate.  Concentration and focus is Within normal. Psychosis is not noted or reported. He reports his mood is euthymic.  Affect is congruent with speech and is normal.  Fund of knowledge is adequate. Insight is adequate for therapy.    ASSESSMENT: Current Emotional / Mental Status (status of significant symptoms):               Risk status               Patient reports safety of self and others               Patient denies current or recent suicidal ideation or behaviors.              Patient denies current or recent homicidal ideation or behaviors.              Patient denies current or recent self injurious behavior or ideation.              Patient denies other safety concerns.              Patient reports no current risk factors               Patient reports supportive partner and parents and his intellect/education background as protective factors               Recommended that patient call 911 or go to the local ED should there be a change in any of these risk factors.    Patient's impression of their current status: Patient presents for psychotherapy diagnostic assessment and treatment recommendations for anxiety and depression. He reports a history of anxiety that dates back to middle school where he suffered from bullying. He believes this is likely where the origins of his anxiety is from.  He now notices his anxiety being present mostly with work related issues. He says he has anxiety episodes Sundays as he prepares to go back to work for the week. He says throughout the week he has quite a bit of cognitive anxiety symptoms--- what ifs and catastrophizing.      Therapist impression of patients current state: This 25 y.o. White or  male presents with history of anxiety and depression.  AIDET used to introduce self to patient. Informed Consent reviewed and verbally agreed to. He has never been formally diagnosed.  He reports no formal mental health history in the way of hospitalizations. He does have a history of medicinal treatment and is open if that is recommended. MI used to explore patient problem.  He is agreeable, willing and motivated.  He agrees to follow up session to complete DA.      Assessment tools used today include: CAGE, C-SSRS- low suicide risk     Type of psychotherapeutic technique provided: Client  centered and MI      Progress toward short term goals:Progress as expected, as evidenced by his willingness to follow up with referral and be engaged in session.       Review of long term goals: Not done at today's visit -- will engage in treatment planning following DA    Diagnosis:   1. Major depressive disorder, single episode, unspecified    2. Anxiety disorder, unspecified type          Plan and Follow-up: 1.  Patient to schedule for continued follow up psychotherapy appointments.    2.  Patient will use their crisis plan and/or access crisis services should symptoms       become worse.      3.  Patient to remain medication compliant.  4.  Patient to abstain from drugs and alcohol.  5.  Patient to complete SHAUN and PHQ    Discharge Criteria/Planning: Patient will continue with follow-up until therapy can be discontinued without return of signs and symptoms.      I have reviewed the note as documented above.  This accurately captures the substance of my conversation with the patient.    As the provider I attest to compliance with applicable laws and regulations related to telemedicine.  Performed and documented [facility]  10/22/2020

## 2021-06-13 NOTE — PROGRESS NOTES
Mental Health Psychotherapy Note    Patient: Jose D Dickens III    : 1995 MRN: [id number]    2020    Start time: 902    Stop Time: 954   Session # 2    Session Type: Patient is presenting for an Individual session.    Jose D Dickens III is a 25 y.o. male is being seen today for    Chief Complaint   Patient presents with     MH Follow Up     Depression     Telemedicine Visit: The patient's condition can be safely assessed and treated via synchronous audio and visual telemedicine encounter.      Reason for Telemedicine Visit: Services only offered telehealth    Originating Site (Patient Location): Patient's home    Distant Site (Provider Location): Provider Remote Setting- Home Office    Consent:  The patient/guardian has verbally consented to: the potential risks and benefits of telemedicine (video visit) versus in person care; bill my insurance or make self-payment for services provided; and responsibility for payment of non-covered services.     Mode of Communication:  Video Conference via [employer]    As the provider I attest to compliance with applicable laws and regulations related to telemedicine.    Those present for this visit patient and therapist   New symptoms or complaints: None    Functional Impairment:   Personal: 3  Family: 3  Social: 3  Work: 3    Clinical assessment of mental status:   Jose D Dickens III presented on time.   He was oriented x3, open and cooperative, and dressed appropriately for this session and weather. His memory was Normal cognitive functioning .  His speech was  Within normal.  Language was appropriate.  Concentration and focus is Within normal. Psychosis is not noted or reported. He reports his mood is Anxious and Depressed.  Affect is congruent with speech and is Depressed.  Fund of knowledge is adequate. Insight is adequate for therapy.    ASSESSMENT: Current Emotional / Mental Status (status of significant symptoms):              Risk status                Patient reports safety of self and others               Patient denies current or recent suicidal ideation or behaviors.              Patient denies current or recent homicidal ideation or behaviors.              Patient denies current or recent self injurious behavior or ideation.              Patient denies other safety concerns.              Patient reports no new risk factors since his last session               Patient reports no new protective factors since last session               Recommended that patient call 911 or go to the local ED should there be a change in any of these risk factors.    Patient's impression of their current status: Patient reports no changes to his mood or situation since last session. He shares a little more regarding his irritabilities and general dissatisfaction with life which he believes is why he is irritable with his girlfriend.     Therapist impression of patients current state: This 25 y.o. White or  male presents with anxiety and depression.  Patient thought/feelings explored and validated. Identified the unhelpful thinking patterns that can be preset for him sometimes.  Explored how to adapt thinking to be more helpful. Patient was engaged and willing to participate in session.     Assessment tools used today include: no assmt tools used in session     Type of psychotherapeutic technique provided: Client centered, CBT and MI      Progress toward short term goals:Progress as expected, as evidenced by practicing breathing exercise.       Review of long term goals: Not done at today's visit     Diagnosis:   1. SHAUN (generalized anxiety disorder)    2. Current episode of major depressive disorder without prior episode, unspecified depression episode severity     improving       Plan and Follow-up: 1.  Patient to schedule for continued follow up psychotherapy appointments.    2.  Patient will use their crisis plan and/or access crisis services should symptoms        become worse.      3.  Patient to remain medication compliant.  4.  Patient to abstain from drugs and alcohol.  5.  Patient to practice mindfulness through watching videos.       Discharge Criteria/Planning: Patient will continue with follow-up until therapy can be discontinued without return of signs and symptoms.      I have reviewed the note as documented above.  This accurately captures the substance of my conversation with the patient.    As the provider I attest to compliance with applicable laws and regulations related to telemedicine.  Performed and documented by TIA García, [facility] 11/17/2020

## 2021-06-13 NOTE — PROGRESS NOTES
Mental Health Psychotherapy Note    Patient: Jose D Dickens III    : 1995 MRN: [id number]    2020    Start time: 902    Stop Time: 950   Session # 4    Completed 2 point verification; patient verbally provided full name and date of birth.      Session Type: Patient is presenting for an Individual session.    Jose D Dickens III is a 25 y.o. male is being seen today for    Chief Complaint   Patient presents with     MH Follow Up     Anxiety   .     Telemedicine Visit: The patient's condition can be safely assessed and treated via synchronous audio and visual telemedicine encounter.      Reason for Telemedicine Visit: Services only offered telehealth    Originating Site (Patient Location): Patient's home    Distant Site (Provider Location): Provider remote site    Consent:  The patient/guardian has verbally consented to: the potential risks and benefits of telemedicine (video visit) versus in person care; bill my insurance or make self-payment for services provided; and responsibility for payment of non-covered services.     Mode of Communication:  Video Conference via [employer]    As the provider I attest to compliance with applicable laws and regulations related to telemedicine.    Those present for this visit patient and therapist     New symptoms or complaints: None    Functional Impairment:   Personal: 3  Family: 3  Social: 3  Work: 3    Clinical assessment of mental status:   Jose D Dickens III presented on time.   He was oriented x3, open and cooperative, and dressed appropriately for this session and weather. His memory was Normal cognitive functioning .  His speech was  Within normal.  Language was appropriate.  Concentration and focus is Within normal. Psychosis is not noted or reported. He reports his mood is Anxious.  Affect is congruent with speech and is Congruent w/content of speech.  Fund of knowledge is adequate. Insight is adequate for therapy.    ASSESSMENT: Current  Emotional / Mental Status (status of significant symptoms):              Risk status               Patient reports safety of self and others               Patient denies current or recent suicidal ideation or behaviors.              Patient denies current or recent homicidal ideation or behaviors.              Patient denies current or recent self injurious behavior or ideation.              Patient denies other safety concerns.              Patient reports no changes to risk factors since last session               Patient reports no changes to protective factors since last session               Recommended that patient call 911 or go to the local ED should there be a change in any of these risk factors.      Patient's impression of their current status: Patient reports believed progress in decreasing irritability. He reports using mindfulness often and increasing awareness into triggers.    Therapist impression of patients current state: This 25 y.o. White or  male presents with anxiety and mild depression.  Thoughts/feelings validated and explored. CBT continues to be used to identify thoughts and opportunities for behavior activation. Explored triggers and warning signs of patient's workplace anxiety. Patient was engaged and appears motivated for change.     Assessment tools used today include: no assmt tools used in session.     Type of psychotherapeutic technique provided: Client centered and CBT      Progress toward short term goals:Progress as expected, as evidenced by self-report and progress in use of mindfulness strategies.       Review of long term goals: Not done at today's visit     Diagnosis:   1. SHAUN (generalized anxiety disorder)    2. Current episode of major depressive disorder without prior episode, unspecified depression episode severity    improving      Plan and Follow-up: 1.  Patient to schedule for continued follow up psychotherapy appointments.    2.  Patient will use their crisis  plan and/or access crisis services should symptoms       become worse.      3.  Patient to remain medication compliant.  4.  Patient to abstain from drugs and alcohol.  5.  Distress tolerance plan  6.  Mindfulness breaks       Discharge Criteria/Planning: Patient will continue with follow-up until therapy can be discontinued without return of signs and symptoms.      I have reviewed the note as documented above.  This accurately captures the substance of my conversation with the patient.    As the provider I attest to compliance with applicable laws and regulations related to telemedicine.  Performed and documented by  TIA García, [facility] [place]    12/14/2020

## 2021-06-13 NOTE — PROGRESS NOTES
Mental Health Psychotherapy Note    Patient: Jose D Dickens III    : 1995 MRN: [id number]    2020    Start time: 908    Stop Time: 951   Session # 3    Session Type: Patient is presenting for an Individual session.    Jose D Dickens III is a 25 y.o. male is being seen today for    Chief Complaint   Patient presents with     MH Follow Up     Anxiety     Depression     Telemedicine Visit: The patient's condition can be safely assessed and treated via synchronous audio and visual telemedicine encounter.      Reason for Telemedicine Visit: Services only offered telehealth    Originating Site (Patient Location): Patient's home    Distant Site (Provider Location): Provider Remote Setting- Home Office    Consent:  The patient/guardian has verbally consented to: the potential risks and benefits of telemedicine (video visit) versus in person care; bill my insurance or make self-payment for services provided; and responsibility for payment of non-covered services.     Mode of Communication:  Video Conference via [employer]ximity    As the provider I attest to compliance with applicable laws and regulations related to telemedicine.    Those present for this visit patient and therapist   New symptoms or complaints: None    Functional Impairment:   Personal: 3  Family: 3  Social: 2  Work: 3    Clinical assessment of mental status:   Jose D Dickens III presented on time.   He was oriented x3, open and cooperative, and dressed appropriately for this session and weather. His memory was Normal cognitive functioning .  His speech was  Within normal.  Language was appropriate.  Concentration and focus is Within normal. Psychosis is not noted or reported. He reports his mood is Anxious.  Affect is congruent with speech and is Anxious and Depressed.  Fund of knowledge is adequate. Insight is adequate for therapy.    ASSESSMENT: Current Emotional / Mental Status (status of significant symptoms):              Risk status                Patient reports safety of self and others               Patient denies current or recent suicidal ideation or behaviors.              Patient denies current or recent homicidal ideation or behaviors.              Patient denies current or recent self injurious behavior or ideation.              Patient denies other safety concerns.              Patient reports no new risk factors since last session               Patient reports no new protective factors since last session               Recommended that patient call 911 or go to the local ED should there be a change in any of these risk factors.    Patient's impression of their current status: Patient reports continued irritable mood/shortness that he believes he is taking out on his girlfriend. He is aware of it and wants to end this behavior.  He reports that they closed on a house this past week and he recognizes how this may have increased his stress this past week.     Therapist impression of patients current state: This 25 y.o. White or  male presents with anxiety and depression. He appears to have insight/awareness about his situation. He gallagher appear to be focused on solutions as opposed to seeking understanding as to what is happening that is causing his shortness.  It seems patient has a vulnerability emotionally that has him acting one way with parents and another way with his girlfriend. He is engaged and willing to explore and process.     Assessment tools used today include: no assmt tools used in this session        Type of psychotherapeutic technique provided: Client centered, CBT and MI      Progress toward short term goals:Progress as expected, as evidenced by practicing mindful activities.       Review of long term goals: Not done at today's visit       Diagnosis:   1. SHAUN (generalized anxiety disorder)    2. Current episode of major depressive disorder without prior episode, unspecified depression episode severity      improving      Plan and Follow-up: 1.  Patient to schedule for continued follow up psychotherapy appointments.    2.  Patient will use their crisis plan and/or access crisis services should symptoms       become worse.      3.  Patient to remain medication compliant.  4.  Patient to abstain from drugs and alcohol.  5.  Patient to practice continued guided meditations and mindful breathing    Discharge Criteria/Planning: Patient will continue with follow-up until therapy can be discontinued without return of signs and symptoms.      I have reviewed the note as documented above.  This accurately captures the substance of my conversation with the patient.    As the provider I attest to compliance with applicable laws and regulations related to telemedicine.  Performed and documented by TIA García, [facility] 11/30/2020

## 2021-06-13 NOTE — PROGRESS NOTES
"[facility]   Evaluator Name:  TIA García, [facility]         PATIENT'S NAME: Jose D Dickens III PREFERRED NAME: Gene  PREFERRED PRONOUNS: he/him/his     MRN:   [id number]  :   1995   ACCT. NUMBER: [id number]  DATE OF SERVICE: 2020  START TIME: 0903  END TIME: 950    STANDARD ADULT PSYCHOTHERAPY DIAGNOSTIC ASSESSMENT    Telemedicine Visit: The patient's condition can be safely assessed and treated via synchronous audio and visual telemedicine encounter.    Reason for Telemedicine Visit: Services only offered telehealth  Originating Site (Patient Location): Patient's home  Distant Site (Provider Location): provider remote setting  Consent:  The patient/guardian has verbally consented to: the potential risks and benefits of telemedicine (video visit) versus in person care; bill my insurance or make self-payment for services provided; and responsibility for payment of non-covered services.   Mode of Communication:  Video Conference via [employer]  As the provider I attest to compliance with applicable laws and regulations related to telemedicine.    Identifying Information:  Patient is a 25 y.o., .  The pronoun use throughout this assessment reflects the patient's chosen pronoun.  Patient was referred for an assessment by primary care provider.  Patient attended the session alone.     Chief Complaint:   The reason for seeking services at this time is: \" anxiety at work \"   The problem(s) began most of my life, but seem to become most prominent during periods of stress. He reports this last episode of increaseing stress/anxiety started abotu 6 months ago. Patient has attempted to resolve these concerns in the past through counseling..    Does the client have any condition that is currently presenting as a potential to harm themselves or others (severe withdrawal, serious medical condition, severe emotional/behavioral problem)? No.  Proceed with assessment.    Social/Family " History:  Patient reported they grew up in [place], [place].  They were raised by biological parents.   Parents remain  today.   Patient reported that he/him/his   childhood was supportive and yet difficult. He reports bullying in the 7th grade around his weight. Patient described their current relationships with family of origin as great/supportive. He does acknowledge insecurity issues.  His girlfriend notes that he has difficulty expressing himself to his family.       The patient describes their cultural background as white, middle class family.  Cultural influences and impact on patient's life structure, values, norms, and healthcare: Social Orientation: middle class.  Contextual influences on patient's health include: Economic Factors middle class.    These factors will be addressed in the Preliminary Treatment plan.  Patient identified their preferred language to be English. Patient reported they does not need the assistance of an  or other support involved in therapy.     Patient reported had no significant delays in developmental tasks.   Patient's highest education level was graduate school. Patient identified the following learning problems: none reported.  Modifications will not be used to assist communication in therapy. Patient reports they are  able to understand written materials.    Patient reported the following relationship history: currently engaged with girlfriend for last several years.  They live together in his parents basement.  Patient's current relationship status is partnered/significant other for 3 years.   Patient identified their sexual orientation as heterosexual.  Patient reported having zero child(randall).     Patient's current living/housing situation involves staying with his girlfriend in parent's home. Looking to find their own. .  They live with significant other and they report that housing is stable. Patient identified partner and parents as part of their support  system.  Patient identified the quality of these relationships as good.      Patient is currently employed full time and reports they are able to function appropriately at work..  Patient reports their finances are obtained through employment .  Patient does not identify finances as a current stressor.      Patient reported that they have not been involved with the legal system. Patient denies being on probation / parole / under the jurisdiction of the court.        Patient's Strengths and Limitations:  Patient identified the following strengths or resources that will help them succeed in treatment: commitment to health and well being, family support, insight, intelligence, motivation, sense of humor, strong social skills and work ethic. Things that may interfere with the patient's success in treatment include: physical miller concerns.   _______________________________________________  Personal and Family Medical History:   Patient does not report a family history of mental health concerns.  Patient reports family history includes Depression in his sister; Diabetes in his father..    Patient does report Mental Health Diagnosis and/or Treatment.  Patient Patient reported the following previous diagnoses which include(s): an Anxiety Disorder.  Patient reported symptoms began middle-high school.   Patient has received mental health services in the past: therapy with does not recall- short lived.  Psychiatric Hospitalizations: none.  Patient denies a history of civil commitment.  Currently, patient is not receiving other mental health services.      Patient has had a physical exam to rule out medical causes for current symptoms.  Date of last physical exam was within the past year. Client was encouraged to follow up with PCP if symptoms were to develop.. The patient has a [place] Primary Care Provider, who is named Yamel Mckay FNP..  Patient reports the following current medical concerns weight.  There are not  significant appetite / nutritional concerns / weight changes.   Patient does not report a history of head injury / trauma / cognitive impairment.      Patient reports current meds as:   Current Outpatient Medications   Medication Sig Dispense Refill     albuterol (PROAIR HFA;PROVENTIL HFA;VENTOLIN HFA) 90 mcg/actuation inhaler Inhale 2 puffs every 4 (four) hours as needed for wheezing. 1 each 3     buPROPion (WELLBUTRIN XL) 300 MG 24 hr tablet Take 1 tablet (300 mg total) by mouth every morning. 90 tablet 3     busPIRone (BUSPAR) 15 MG tablet Take 1 tablet (15 mg total) by mouth 3 (three) times a day. 90 tablet 0     hydrOXYzine pamoate (VISTARIL) 25 MG capsule Take 1-2 capsules (25-50 mg total) by mouth 2 (two) times a day as needed for itching. 60 capsule 0     LORazepam (ATIVAN) 0.5 MG tablet Take 1 tablet (0.5 mg total) by mouth every 8 (eight) hours as needed for anxiety. 10 tablet 0     No current facility-administered medications for this visit.        Medication Adherence:  Patient reports taking prescribed medications as prescribed.    Patient Allergies:  No Known Allergies    Medical History:    Past Medical History:   Diagnosis Date     Anxiety      Asthma      Depression          Current Mental Status Exam:   Appearance:  Appropriate    Eye Contact:  Good   Psychomotor:  Normal       Gait / station:  no problem  Attitude / Demeanor: Cooperative   Speech      Rate / Production: Normal/ Responsive      Volume:  Normal  volume      Language:  intact  Mood:   Anxious   Affect:   Appropriate    Thought Content: Clear   Thought Process: Coherent       Associations: No loosening of associations  Insight:   Good   Judgment:  Intact   Orientation:  All  Attention/concentration: Good    Rating Scales:    PHQ9:    PHQ-9 SCORE 3/18/2020 8/19/2020 10/27/2020   PHQ-9 Total Score 4 6 9   ;    GAD7:    SHAUN-7 Total Score 6/18/2020 8/19/2020 10/27/2020   SHAUN-7 Total Score 21 9 10         Substance Use:  Patient did not  report a family history of substance use concerns; see medical history section for details.  Patient has not received chemical dependency treatment in the past.  Patient has not ever been to detox.      Patient is not currently receiving any chemical dependency treatment. Patient reported the following problems as a result of their substance use: none identified.    Patient Drink rarely- 1-2/month  Patient history of chewing tobacco- quit in grad school  Patient reports using marijuana a couple times a week to relax  Patient reports 2-3 cups of caffeine/day  Patient reports using/abusing the following substance(s). Patient reported no other substance use.     CAGE- AID:    CAGE-AID Total Score 10/22/2020   Total Score 0       Substance Use: No symptoms    Based on the negative CAGE score and clinical interview there  are not indications of drug or alcohol abuse..    Significant Losses / Trauma / Abuse / Neglect Issues:   Patient did not serve in the .  There are indications or report of significant loss, trauma, abuse or neglect issues related to: none.  Concerns for possible neglect are not present.     Safety Assessment:   Current Safety Concerns:  [place] Suicide Severity Rating Scale (Lifetime/Recent)  [place] Suicide Severity Rating (Lifetime/Recent) 10/22/2020   1. Wish to be Dead (Lifetime) No   2. Non-Specific Active Suicidal Thoughts (Lifetime) No     Patient denies current homicidal ideation and behaviors.  Patient denies current self-injurious ideation and behaviors.    Patient denied risk behaviors associated with substance use.  Patient denies any high risk behaviors associated with mental health symptoms.  Patient reports the following current concerns for their personal safety: None.  Patient reports there are firearms in the house. none.     History of Safety Concerns:  Patient denied a history of homicidal ideation.    Patient denied a history of personal safety concerns.    Patient denied  a history of assaultive behaviors.   Patient denied a history of assaultive behaviors.     Patient denied a history of risk behaviors associated with substance use.  Patient denies any history of high risk behaviors associated with mental health symptoms.  Patient reports the following protective factors: forward/future oriented thinking, dedication to family/friends, safe and stable environment, secure attachment, living with other people, daily obligations, structured day, effective problem-solving skills, committment to well-being, positive social skills and financial stability    Risk Plan:  See Preliminary Treatment Plan for Safety and Risk Management Plan    Review of Symptoms per patient report:  Depression: No symptoms, Difficulties concentrating, Low self-worth, Irritability, Feeling sad, down, or depressed and Withdrawn  Malia:  No Symptoms  Psychosis: No Symptoms  Anxiety: Excessive worry, Nervousness, Physical complaints, such as headaches, stomachaches, muscle tension, Ruminations, Poor concentration and Irritability  Panic:  No symptoms  Post Traumatic Stress Disorder:  No Symptoms   Eating Disorder: No Symptoms  ADD / ADHD:  No symptoms  Conduct Disorder: No symptoms  Autism Spectrum Disorder: No symptoms  Obsessive Compulsive Disorder: No Symptoms    Patient reports the following compulsive behaviors and treatment history: none.      Diagnostic Criteria:   A. Excessive anxiety and worry about a number of events or activities (such as work or school performance).   B. The person finds it difficult to control the worry.  C. Select 3 or more symptoms (required for diagnosis). Only one item is required in children.   - Restlessness or feeling keyed up or on edge.    - Difficulty concentrating or mind going blank.    - Irritability.   D. The focus of the anxiety and worry is not confined to features of an Axis I disorder.  E. The anxiety, worry, or physical symptoms cause clinically significant distress  or impairment in social, occupational, or other important areas of functioning.   F. The disturbance is not due to the direct physiological effects of a substance (e.g., a drug of abuse, a medication) or a general medical condition (e.g., hyperthyroidism) and does not occur exclusively during a Mood Disorder, a Psychotic Disorder, or a Pervasive Developmental Disorder.    Functional Status:  Patient reports the following functional impairments: relationship(s), social interactions and work / vocational responsibilities.     WHODAS: 22    Clinical Summary:   1. Reason for assessment: anxiety  .  2. Psychosocial, Cultural and Contextual Factors: middle class; living in parents basement, saving for own home with SO; graduate degree in chemistry   .  3. As evidenced by self report and criteria, client meets the following DSM5 Diagnoses:   (Sustained by DSM5 Criteria Listed Above)  300.02 (F41.1) Generalized Anxiety Disorder.  Other Diagnoses that is relevant to services: 296.30 (F33.9) Major Depressive Disorder, Recurrent Episode, Unspecified With anxious distress.  4. Prognosis: Expect Improvement.  7. Likely consequences of symptoms if not treated: worsening anxiety leads to interpersonal problems with partner and worsening depression and impaired functionality at work.  8. Client strengths include:  caring, educated, empathetic, employed, goal-focused, good listener, has a previous history of therapy, insightful, intelligent, motivated, open to learning, open to suggestions / feedback, support of family, friends and providers, wants to learn and willing to ask questions .     Recommendations:     1. Plan for Safety and Risk Management:Recommended that patient call 911 or go to the local ED should there be a change in any of these risk factors..  Report to child / adult protection services was NA.     2. Patient's identified Mental health and Physical health concerns with a cultural influence will be addressed by CBT.      3. Initial Treatment will focus on: Anxiety - work and with interpersonal relationship.     4. Resources/Service Plan:       services are not indicated.     Modifications to assist communication are not indicated.     Additional disability accommodations are not indicated.      5. Collaboration:  Collaboration / coordination of treatment will be initiated with the following support professionals: primary care physician.      6.  Referrals:  The following referral(s) will be initiated: none needed at this time as reviewed and agreed to with patient. .  Next Scheduled Appointment: 11/17/2020.  A Release of Information has been obtained for the following: none at this time.    7. LUDIVINA: LUDIVINA:  Discussed denies using alcohol.     8. Records were reviewed at time of assessment.  Information in this assessment was obtained from the medical record and provided by patient who is a good historian.   Patient will have open access to their mental health medical record..      Eval type:  Mental Health    Staff Name/Credentials:  TIA García, [facility]  11/12/2020

## 2021-06-14 NOTE — PROGRESS NOTES
Mental Health Psychotherapy Note    Patient: Jose D Dickens III    : 1995 MRN: [id number]    2021    Start time: 802    Stop Time: 850   Session # 1    Completed 2 point verification; patient verbally provided full name and date of birth.      Session Type: Patient is presenting for an Individual session.    Jose D Dickens III is a 25 y.o. male is being seen today for    Chief Complaint   Patient presents with     MH Follow Up     Anxiety     Depression   .     Telemedicine Visit: The patient's condition can be safely assessed and treated via synchronous audio and visual telemedicine encounter.      Reason for Telemedicine Visit: Services only offered telehealth    Originating Site (Patient Location): Patient's home    Distant Site (Provider Location): provider remote location    Consent:  The patient/guardian has verbally consented to: the potential risks and benefits of telemedicine (video visit) versus in person care; bill my insurance or make self-payment for services provided; and responsibility for payment of non-covered services.     Mode of Communication:  Video Conference via [employer]    As the provider I attest to compliance with applicable laws and regulations related to telemedicine.    Those present for this visit patient and therapist     Follow up in regards to ongoing symptom management of anxiety and interpersonal distress    New symptoms or complaints: None    Functional Impairment:   Personal: 2  Family: 3  Social: 2  Work: 2    Clinical assessment of mental status:   Jose D Dickens III presented on time.   He was oriented x3, open and cooperative, and dressed appropriately for this session and weather. His memory was Normal cognitive functioning .  His speech was  Within normal.  Language was appropriate.  Concentration and focus is Within normal. Psychosis is not noted or reported. He reports his mood is euthymic.  Affect is congruent with speech and is normal.  [facility]  "knowledge is adequate. Insight is adequate for therapy.    ASSESSMENT: Current Emotional / Mental Status (status of significant symptoms):              Risk status              Patient reports safety of self and others               Patient denies current or recent suicidal ideation or behaviors.              Patient denies current or recent homicidal ideation or behaviors.              Patient denies current or recent self injurious behavior or ideation.              Patient denies other safety concerns.              Patient reports no risk factors since last session               Patient reports no changes to protective factors since last session               Recommended that patient call 911 or go to the local ED should there be a change in any of these risk factors.    Patient's impression of their current status: Patient reports some continued progress being made with his emphasis on mindfulness and early stages of re-framing and adapting thoughts. He indicates continued escalation of arguments with partner. He indicates that he is \"short\" with her as a regular trigger.     Therapist impression of patients current state: This 25 y.o. White or  male presents with SHAUN and mild depression. Thoughts/feelings explored and validated. Continued use of CBT used to explore/identfy thought distortions and opportunities for change/re-frames. Patient was engaged and motivated in session.     Assessment tools used today include: no assmt tools used in session     Type of psychotherapeutic technique provided: Client centered and CBT      Progress toward short term goals:Progress as expected, as evidenced by patient reprot uf using mindfulness stratigies.       Review of long term goals: Treatment Plan updated  Diagnosis:   1. SHAUN (generalized anxiety disorder)    2. Current episode of major depressive disorder without prior episode, unspecified depression episode severity     improving      Plan and Follow-up: 1.  " Patient to schedule for continued follow up psychotherapy appointments.    2.  Patient will use their crisis plan and/or access crisis services should symptoms       become worse.      3.  Patient to remain medication compliant.  4.  Patient to abstain from drugs and alcohol.  5.  Patient to practice regular thought adaptation as practiced in session       Discharge Criteria/Planning: Patient will continue with follow-up until therapy can be discontinued without return of signs and symptoms.      I have reviewed the note as documented above.  This accurately captures the substance of my conversation with the patient.    As the provider I attest to compliance with applicable laws and regulations related to telemedicine.  Performed and documented by  TIA García, lICSW    1/4/2021

## 2021-06-15 NOTE — PATIENT INSTRUCTIONS - HE
Based on the information provided, I would recommend you come in for an appointment to discuss these symptoms. Please schedule an appointment. This cn also be a virtual appointment.    You will not be charged for this eVisit.

## 2021-06-15 NOTE — PROGRESS NOTES
Jose D Dickens III is a 25 y.o. male who is being evaluated via a billable video visit.      How would you like to obtain your AVS? MyChart.  If dropped from the video visit, the video invitation should be resent by: Text to cell phone: [phone]  Will anyone else be joining your video visit? No      Video Start Time: 8:00 AM    Assessment & Plan     There are no diagnoses linked to this encounter.    26 minutes spent on the date of the encounter doing chart review, patient visit and documentation        No follow-ups on file.    DELBERT Bliss  [facility]    Subjective   Jose D Dickens III is 25 y.o. and presents today for the following health issues   HPI: Patient with history of anxiety and depression is concerned with difficulty starting and completing tasks, getting bored easily and procrastinating.  He also reported that he quits easily after starting to do something.  He stated that his anxiety and depression is better but he seems not to be able to finish and is forgetting sometimes when he start something.  Patient would like to get evaluated for ADHD.  Patient denied any suicidal or homicidal ideations.      Review of Systems  Review of Systems - History obtained from the patient  General ROS: negative        Objective    Vitals - Patient Reported  Weight (Patient Reported): 300 lb (136.1 kg)    Physical Exam              Video-Visit Details    Type of service:  Video Visit    Video End Time (time video stopped): 8:20 AM  Originating Location (pt. Location): Home    Distant Location (provider location):  [facility]     Platform used for Video Visit: [employer]

## 2021-06-15 NOTE — TELEPHONE ENCOUNTER
Controlled Substance Refill Request  Medication Name:   Requested Prescriptions     Pending Prescriptions Disp Refills     LORazepam (ATIVAN) 0.5 MG tablet 10 tablet 0     Sig: Take 1 tablet (0.5 mg total) by mouth every 8 (eight) hours as needed for anxiety.     Date Last Fill: 8/23/20  Requested Pharmacy: [facility]  Submit electronically to pharmacy  Controlled Substance Agreement on file:   Encounter-Level CSA Scan Date:    There are no encounter-level csa scan date.        Last office visit:  2/16/21

## 2021-06-16 NOTE — TELEPHONE ENCOUNTER
Refill Request  Medication name:   Requested Prescriptions     Pending Prescriptions Disp Refills     busPIRone (BUSPAR) 15 MG tablet 90 tablet 0     Sig: Take 1 tablet (15 mg total) by mouth 3 (three) times a day.     Who prescribed the medication: Promise  Last refill on medication: 08/19/20  Requested Pharmacy: [facility]  Last appointment with PCP: 02/16/21  Next appointment: Not due    Janelle Tan RT (R)

## 2021-06-17 ENCOUNTER — VIRTUAL VISIT (OUTPATIENT)
Dept: PSYCHOLOGY | Facility: CLINIC | Age: 26
End: 2021-06-17
Payer: COMMERCIAL

## 2021-06-17 DIAGNOSIS — F41.1 GENERALIZED ANXIETY DISORDER: ICD-10-CM

## 2021-06-17 DIAGNOSIS — F90.2 ATTENTION DEFICIT HYPERACTIVITY DISORDER (ADHD), COMBINED TYPE: Primary | ICD-10-CM

## 2021-06-17 DIAGNOSIS — F33.0 MAJOR DEPRESSIVE DISORDER, RECURRENT EPISODE, MILD WITH ANXIOUS DISTRESS (H): ICD-10-CM

## 2021-06-17 DIAGNOSIS — L98.1 EXCORIATION, NEUROTIC: ICD-10-CM

## 2021-06-17 PROCEDURE — 90834 PSYTX W PT 45 MINUTES: CPT | Mod: 95 | Performed by: PSYCHOLOGIST

## 2021-06-17 ASSESSMENT — PATIENT HEALTH QUESTIONNAIRE - PHQ9
5. POOR APPETITE OR OVEREATING: SEVERAL DAYS
SUM OF ALL RESPONSES TO PHQ QUESTIONS 1-9: 4

## 2021-06-17 ASSESSMENT — ANXIETY QUESTIONNAIRES
6. BECOMING EASILY ANNOYED OR IRRITABLE: NOT AT ALL
3. WORRYING TOO MUCH ABOUT DIFFERENT THINGS: SEVERAL DAYS
IF YOU CHECKED OFF ANY PROBLEMS ON THIS QUESTIONNAIRE, HOW DIFFICULT HAVE THESE PROBLEMS MADE IT FOR YOU TO DO YOUR WORK, TAKE CARE OF THINGS AT HOME, OR GET ALONG WITH OTHER PEOPLE: SOMEWHAT DIFFICULT
2. NOT BEING ABLE TO STOP OR CONTROL WORRYING: SEVERAL DAYS
1. FEELING NERVOUS, ANXIOUS, OR ON EDGE: SEVERAL DAYS
GAD7 TOTAL SCORE: 5
5. BEING SO RESTLESS THAT IT IS HARD TO SIT STILL: SEVERAL DAYS
7. FEELING AFRAID AS IF SOMETHING AWFUL MIGHT HAPPEN: NOT AT ALL

## 2021-06-17 NOTE — PROGRESS NOTES
Progress Note    Patient Name: Jose D Dickens  Date: 2021          Service Type: Individual for ADHD 6 week follow-up      Session Start Time: 12:10PM  Session End Time: 12:49     Session Length: 39    Session #: ADHD 6 week follow up    Attendees: Client attended alone    Service Modality:  Video Visit:      Provider verified identity through the following two step process.  Patient provided:  Patient     Telemedicine Visit: The patient's condition can be safely assessed and treated via synchronous audio and visual telemedicine encounter.      Reason for Telemedicine Visit: Services only offered telehealth    Originating Site (Patient Location): Patient's home    Distant Site (Provider Location): Provider Remote Setting- Home Office    Consent:  The patient/guardian has verbally consented to: the potential risks and benefits of telemedicine (video visit) versus in person care; bill my insurance or make self-payment for services provided; and responsibility for payment of non-covered services.     Patient would like the video invitation sent by:  Send to e-mail at: [email or web address]    Mode of Communication:  Video Conference via [employer]    As the provider I attest to compliance with applicable laws and regulations related to telemedicine.       PHQ-9 / SHAUN-7 : 4/5    DATA  Interactive Complexity: No  Crisis: No       Progress Since Last Session (Related to Symptoms / Goals / Homework):   Symptoms: Improving for attention and task completion    Homework: Partially completed      Episode of Care Goals: Achieved / completed to satisfaction - MAINTENANCE (Working to maintain change, with risk of relapse); Intervened by continuing to positively reinforce healthy behavior choice      Current / Ongoing Stressors and Concerns:   Work Related Stress-improving   Attention Concerns-improving   Purchased his first home and is unpacking     Treatment Objective(s) Addressed  "in This Session:   comply with medication 100% of the time    Patient reported that he has transferred to PCP Flor Brown PA-C with [facility].    Patient reported that Stephanie started by prescribing 20mg Adderall Extended Release. He reported that he noticed the medication wearing off in the afternoon, so he is now prescribed 25mg XR in the morning and 10-20mg Adderall IR in the afternoon. He reported that he usually takes 25mg in the morning and 10mg in the afternoon. 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 He reported that his work productivity improved, he is enjoying his job, and is not as easily distracted. He reported that he works on site \"a couple days a week\" and works from home the rest of the week. He reported that he is more distracted at home, but is able to redirect his attention more easily since starting the Adderall.     Patient reported that when he first starting taking Adderall, he experienced discomfort with urination and the discomfort dissipated nagi he started drinking more water. He also reported that he initially was not able to fall asleep as well as he had been; however, he said his sleep has normalized. Moreover, patient reported that he \"neglects\" to eat for most of the day approximately two times a month since starting Adderall.     Patient reported that he has decreased his skin picking. Patient reported that his legs look much better and he is making progress with picking at the skin on his arms.     Encouraged patient to use timers to help remind him to return to work when he needs a break. Encouraged patient to use a treadmill or stationary bike when he feels the need to move while watching training videos.     Patient reported that he has not started exercising formally; however, he is walking his dogs 1-2 times a week and is doing yard work on the weekends.     Patient reported that his \"overall\" anxiety has improved " "since he started taking Adderall. Patient reported that his also doing better with maintaining his hygiene. Patient reported that he arrives to work on time, but as not yet made the shift to arrive to personal things on time.     Patient said \"it is a relief\" to have the ADHD assessment completed and to be prescribed medication to treat his symptoms.        Interventions:  CBT: socratic questioning, normalizing, positive reinforcement   MI: open ended questions       ASSESSMENT: Current Emotional / Mental Status (status of significant symptoms):   Risk status (Self / Other harm or suicidal ideation)   Patient denies current fears or concerns for personal safety.   Patient denies current or recent suicidal ideation or behaviors.   Patient denies current or recent homicidal ideation or behaviors.   Patient denies current or recent self injurious behavior or ideation.   Patient denies other safety concerns.   Patient reports there has been no change in risk factors since their last session.     Patient reports there has been no change in protective factors since their last session.     Recommended that patient call 911 or go to the local ED should there be a change in any of these risk factors.     Appearance:   Appropriate    Eye Contact:   Good    Psychomotor Behavior: Hyperactive    Attitude:   Cooperative    Orientation:   All   Speech    Rate / Production: Normal     Volume:  Normal    Mood:    Normal   Affect:    Appropriate    Thought Content:  Clear    Thought Form:  Coherent  Logical    Insight:    Good      Medication Review:   Changes to psychiatric medications, see updated Medication List in EPIC.   Adderall 20mg Extended Release in the morning.  Adderall 10-20mg in the afternoon.      Medication Compliance:   Yes     Changes in Health Issues:   None reported     Chemical Use Review:   Substance Use: Chemical use reviewed, no active concerns identified      Tobacco Use: No current tobacco use.  "     Diagnosis:  1. Attention deficit hyperactivity disorder (ADHD), combined type    2. Major depressive disorder, recurrent episode, mild with anxious distress (H)    3. Generalized anxiety disorder    4. Excoriation, neurotic        Collateral Reports Completed:   Not Applicable    PLAN: (Patient Tasks / Therapist Tasks / Other)  .  Patient was informed that the assessment process has been completed and that they can contact [facility] in the future if they have mental health concerns.           Swapna Hernandes PsyD LP 6/17/2021

## 2021-06-18 ASSESSMENT — ANXIETY QUESTIONNAIRES: GAD7 TOTAL SCORE: 5

## 2021-06-20 NOTE — LETTER
Letter by Yamel Mckay FNP at      Author: Yamel Mckay FNP Service: -- Author Type: --    Filed:  Encounter Date: 3/18/2020 Status: (Other)       My Asthma Action Plan     Name: Jose D Dickens III   YOB: 1995  Date: 3/18/2020   My doctor: DELBERT Bliss   My clinic: [facility]        My Rescue Medicine:   Albuterol (Proair/Ventolin/Proventil HFA) 2-4 puffs EVERY 4 HOURS as needed. Use a spacer if recommended by your provider.   My Asthma Severity:   Intermittent/Exercise Induced  Know your asthma triggers: exercise or sports             GREEN ZONE   Good Control    I feel good    No cough or wheeze    Can work, sleep and play without asthma symptoms     Take your asthma control medicine every day.     1. If exercise triggers your asthma, take your rescue medication    15 minutes before exercise or sports, and    During exercise if you have asthma symptoms  2. Spacer to use with inhaler: If you have a spacer, make sure to use it with your inhaler             YELLOW ZONE Getting Worse  I have ANY of these:    I do not feel good    Cough or wheeze    Chest feels tight    Wake up at night 1. Keep taking your Green Zone medications  2. Start taking your rescue medicine:    every 20 minutes for up to 1 hour. Then every 4 hours for 24-48 hours.  3. If you stay in the Yellow Zone for more than 12-24 hours, contact your doctor.  4. If you do not return to the Green Zone in 12-24 hours or you get worse, start taking your oral steroid medicine if prescribed by your provider.           RED ZONE Medical Alert - Get Help  I have ANY of these:    I feel awful    Medicine is not helping    Breathing getting harder    Trouble walking or talking    Nose opens wide to breathe     1. Take your rescue medicine NOW  2. If your provider has prescribed an oral steroid medicine, start taking it NOW  3. Call your doctor NOW  4. If you are still in the Red Zone after  20 minutes and you have not reached your doctor:    Take your rescue medicine again and    Call 911 or go to the emergency room right away    See your regular doctor within 2 weeks of an Emergency Room or Urgent Care visit for follow-up treatment.          Annual Reminders:  Meet with Asthma Educator,  Flu Shot in the Fall, consider Pneumonia Vaccination for patients with asthma (aged 19 and older).    Pharmacy:   [place] [id number] [place] [address]  [address] [facility] [address]  Phone: [phone] Fax: [phone]      Electronically signed by DELBERT Bliss   Date: 03/18/20                      Asthma Triggers  How To Control Things That Make Your Asthma Worse    Triggers are things that make your asthma worse.  Look at the list below to help you find your triggers and what you can do about them.  You can help prevent asthma flare-ups by staying away from your triggers.      Trigger                                                          What you can do   Cigarette Smoke  Tobacco smoke can make asthma worse. Do not allow smoking in your home, car or around you.  Be sure no one smokes at a sek[facility] day care or school.  If you smoke, ask your health care provider for ways to help you quit.  Ask family members to quit too.  Ask your health care provider for a referral to Quit Plan to help you quit smoking, or call 7-085-126-PLAN.     Colds, Flu, Bronchitis  These are common triggers of asthma. Wash your hands often.  Dont touch your eyes, nose or mouth.  Get a flu shot every year.     Dust Mites  These are tiny bugs that live in cloth or carpet. They are too small to see. Wash sheets and blankets in hot water every week.   Encase pillows and mattress in dust mite proof covers.  Avoid having carpet if you can. If you have carpet, vacuum weekly.   Use a dust mask and HEPA vacuum.   Pollen and Outdoor Mold  Some people are allergic to trees, grass, or weed pollen, or molds. Try to  keep your windows closed.  Limit time out doors when pollen count is high.   Ask you health care provider about taking medicine during allergy season.     Animal Dander  Some people are allergic to skin flakes, urine or saliva from pets with fur or feathers. Keep pets with fur or feathers out of your home.    If you cant keep the pet outdoors, then keep the pet out of your bedroom.  Keep the bedroom door closed.  Keep pets off cloth furniture and away from stuffed toys.     Mice, Rats, and Cockroaches  Some people are allergic to the waste from these pests.   Cover food and garbage.  Clean up spills and food crumbs.  Store grease in the refrigerator.   Keep food out of the bedroom.   Indoor Mold  This can be a trigger if your home has high moisture. Fix leaking faucets, pipes, or other sources of water.   Clean moldy surfaces.  Dehumidify basement if it is damp and smelly.   Smoke, Strong Odors, and Sprays  These can reduce air quality. Stay away from strong odors and sprays, such as perfume, powder, hair spray, paints, smoke incense, paint, cleaning products, candles and new carpet.   Exercise or Sports  Some people with asthma have this trigger. Be active!  Ask your doctor about taking medicine before sports or exercise to prevent symptoms.    Warm up for 5-10 minutes before and after sports or exercise.     Other Triggers of Asthma  Cold air:  Cover your nose and mouth with a scarf.  Sometimes laughing or crying can be a trigger.  Some medicines and food can trigger asthma.

## 2021-06-23 ENCOUNTER — COMMUNICATION - HEALTHEAST (OUTPATIENT)
Dept: FAMILY MEDICINE | Facility: CLINIC | Age: 26
End: 2021-06-23

## 2021-06-23 DIAGNOSIS — F41.0 PANIC ATTACK: ICD-10-CM

## 2021-06-23 DIAGNOSIS — F33.1 MODERATE EPISODE OF RECURRENT MAJOR DEPRESSIVE DISORDER (H): ICD-10-CM

## 2021-06-23 RX ORDER — LORAZEPAM 0.5 MG/1
0.5 TABLET ORAL EVERY 8 HOURS PRN
Qty: 10 TABLET | Refills: 0 | Status: SHIPPED | OUTPATIENT
Start: 2021-06-23

## 2021-06-26 NOTE — TELEPHONE ENCOUNTER
Refill Request  Medication name:   Requested Prescriptions     Pending Prescriptions Disp Refills     LORazepam (ATIVAN) 0.5 MG tablet 10 tablet 0     Sig: Take 1 tablet (0.5 mg total) by mouth every 8 (eight) hours as needed for anxiety.     Who prescribed the medication: Woody  Last refill on medication: 02/10/21  Requested Pharmacy: [facility]  Last appointment with PCP: 02/16/21  Next appointment: Not due    RT Jarad (R)

## 2021-08-15 ENCOUNTER — HEALTH MAINTENANCE LETTER (OUTPATIENT)
Age: 26
End: 2021-08-15

## 2021-08-17 DIAGNOSIS — F41.1 GAD (GENERALIZED ANXIETY DISORDER): ICD-10-CM

## 2021-08-17 DIAGNOSIS — F33.1 MODERATE EPISODE OF RECURRENT MAJOR DEPRESSIVE DISORDER (H): ICD-10-CM

## 2021-08-17 NOTE — TELEPHONE ENCOUNTER
Refill Request  Medication name: Pending Prescriptions:                       Disp   Refills    buPROPion (WELLBUTRIN XL) 300 MG 24 hr ta*90 tab*3            Sig: Take 1 tablet (300 mg) by mouth every morning    Who prescribed the medication: Anna  Last refill on medication: 06/21  Requested Pharmacy: [facility]  Last appointment with PCP: 02/16/21  Next appointment: Not due

## 2021-08-18 RX ORDER — BUPROPION HYDROCHLORIDE 300 MG/1
300 TABLET ORAL EVERY MORNING
Qty: 90 TABLET | Refills: 3 | Status: SHIPPED | OUTPATIENT
Start: 2021-08-18

## 2021-10-11 ENCOUNTER — HEALTH MAINTENANCE LETTER (OUTPATIENT)
Age: 26
End: 2021-10-11

## 2022-09-25 ENCOUNTER — HEALTH MAINTENANCE LETTER (OUTPATIENT)
Age: 27
End: 2022-09-25

## 2023-10-14 ENCOUNTER — HEALTH MAINTENANCE LETTER (OUTPATIENT)
Age: 28
End: 2023-10-14